# Patient Record
Sex: FEMALE | Race: WHITE | NOT HISPANIC OR LATINO | Employment: UNEMPLOYED | URBAN - METROPOLITAN AREA
[De-identification: names, ages, dates, MRNs, and addresses within clinical notes are randomized per-mention and may not be internally consistent; named-entity substitution may affect disease eponyms.]

---

## 2018-01-04 ENCOUNTER — HOSPITAL ENCOUNTER (EMERGENCY)
Facility: HOSPITAL | Age: 23
Discharge: HOME | End: 2018-01-04
Attending: EMERGENCY MEDICINE | Admitting: EMERGENCY MEDICINE
Payer: COMMERCIAL

## 2018-01-04 VITALS
OXYGEN SATURATION: 96 % | RESPIRATION RATE: 20 BRPM | SYSTOLIC BLOOD PRESSURE: 135 MMHG | TEMPERATURE: 97.7 F | HEART RATE: 91 BPM | WEIGHT: 175 LBS | DIASTOLIC BLOOD PRESSURE: 78 MMHG

## 2018-01-04 DIAGNOSIS — J02.9 PHARYNGITIS: Primary | ICD-10-CM

## 2018-01-04 DIAGNOSIS — J03.90 TONSILLITIS: ICD-10-CM

## 2018-01-04 LAB — EXT PREG TEST URINE: NORMAL

## 2018-01-04 PROCEDURE — 99284 EMERGENCY DEPT VISIT MOD MDM: CPT

## 2018-01-04 PROCEDURE — 96372 THER/PROPH/DIAG INJ SC/IM: CPT

## 2018-01-04 PROCEDURE — 81025 URINE PREGNANCY TEST: CPT | Performed by: EMERGENCY MEDICINE

## 2018-01-04 RX ORDER — DEXAMETHASONE SODIUM PHOSPHATE 10 MG/ML
8 INJECTION, SOLUTION INTRAMUSCULAR; INTRAVENOUS ONCE
Status: COMPLETED | OUTPATIENT
Start: 2018-01-04 | End: 2018-01-04

## 2018-01-04 RX ORDER — IBUPROFEN 800 MG/1
800 TABLET ORAL 3 TIMES DAILY
Qty: 21 TABLET | Refills: 0 | Status: SHIPPED | OUTPATIENT
Start: 2018-01-04 | End: 2021-06-24

## 2018-01-04 RX ORDER — KETOROLAC TROMETHAMINE 30 MG/ML
60 INJECTION, SOLUTION INTRAMUSCULAR; INTRAVENOUS ONCE
Status: COMPLETED | OUTPATIENT
Start: 2018-01-04 | End: 2018-01-04

## 2018-01-04 RX ADMIN — DEXAMETHASONE SODIUM PHOSPHATE 8 MG: 10 INJECTION, SOLUTION INTRAMUSCULAR; INTRAVENOUS at 07:38

## 2018-01-04 RX ADMIN — KETOROLAC TROMETHAMINE 60 MG: 30 INJECTION, SOLUTION INTRAMUSCULAR at 07:38

## 2018-01-04 NOTE — ED PROVIDER NOTES
History  Chief Complaint   Patient presents with    Sore Throat - Complicated     has seen urgent care twice  diagnoased with strep and a sinus infection, now difficult swallowing  History provided by:  Patient   used: No    Sore Throat   Location:  Generalized  Quality:  Aching and burning  Severity:  Moderate  Onset quality:  Gradual  Duration:  1 week  Timing:  Constant  Progression:  Unchanged  Chronicity:  New  Relieved by:  None tried  Worsened by:  Swallowing, eating and drinking  Ineffective treatments: Abx, recently seen at [address] dx 1st with sinusitis, tx with Augmenting for 4 days, then dx with strep pharyngitis, now on Amox since yesterday, but swelling is unchaged  Associated symptoms: adenopathy, chills, ear pain, sinus congestion and trouble swallowing    Associated symptoms: no abdominal pain, no chest pain, no cough, no drooling, no ear discharge, no epistaxis, no eye discharge, no fever, no headaches, no neck stiffness, no night sweats, no plugged ear sensation, no postnasal drip, no rash, no rhinorrhea, no shortness of breath, no stridor and no voice change    Associated symptoms comment:  Painful swallowing  Risk factors: no exposure to strep, no exposure to mono, no sick contacts, no recent endoscopy and no recent ENT procedure    Risk factors comment:  Dx with strep      None       History reviewed  No pertinent past medical history  History reviewed  No pertinent surgical history  History reviewed  No pertinent family history  I have reviewed and agree with the history as documented  Social History   Substance Use Topics    Smoking status: Current Every Day Smoker     Packs/day: 0 25    Smokeless tobacco: Never Used    Alcohol use No        Review of Systems   Constitutional: Positive for chills  Negative for diaphoresis, fatigue, fever and night sweats  HENT: Positive for ear pain, sinus pressure, sore throat and trouble swallowing   Negative for congestion, dental problem, drooling, ear discharge, facial swelling, hearing loss, mouth sores, nosebleeds, postnasal drip, rhinorrhea, sinus pain, sneezing, tinnitus and voice change  Left ear pain when she swallows  Sinus congestion/pressure improved   Eyes: Negative for pain, discharge, redness and itching  Respiratory: Negative for cough, chest tightness, shortness of breath and stridor  Cardiovascular: Negative for chest pain  Gastrointestinal: Negative for abdominal pain, diarrhea, nausea and vomiting  Genitourinary: Negative for difficulty urinating, flank pain and menstrual problem  LMP 2 wks ago   Musculoskeletal: Negative for back pain, neck pain and neck stiffness  Skin: Negative for pallor, rash and wound  Neurological: Negative for dizziness, syncope, weakness, light-headedness and headaches  Hematological: Positive for adenopathy  Psychiatric/Behavioral: The patient is nervous/anxious  Physical Exam  ED Triage Vitals [01/04/18 0711]   Temperature Pulse Respirations Blood Pressure SpO2   97 7 °F (36 5 °C) 91 20 135/78 96 %      Temp Source Heart Rate Source Patient Position - Orthostatic VS BP Location FiO2 (%)   Tympanic Monitor Sitting Left arm --      Pain Score       7           Orthostatic Vital Signs  Vitals:    01/04/18 0711   BP: 135/78   Pulse: 91   Patient Position - Orthostatic VS: Sitting       Physical Exam   Constitutional: She is oriented to person, place, and time  She appears well-developed and well-nourished  No distress  overwt F in NAD   HENT:   Head: Normocephalic and atraumatic  Right Ear: Hearing, tympanic membrane, external ear and ear canal normal    Left Ear: Hearing, external ear and ear canal normal  No lacerations  No drainage, swelling or tenderness  No foreign bodies  No mastoid tenderness  Tympanic membrane is bulging  Tympanic membrane is not injected, not scarred, not perforated, not erythematous and not retracted    No middle ear effusion  No hemotympanum  No decreased hearing is noted  Nose: Nose normal    Mouth/Throat: Mucous membranes are normal  Oropharyngeal exudate and posterior oropharyngeal erythema present  No posterior oropharyngeal edema or tonsillar abscesses  Tonsils are 2+ on the right  Tonsils are 2+ on the left  B/l white tonsilar exudates   Eyes: Conjunctivae and EOM are normal  Pupils are equal, round, and reactive to light  Right eye exhibits no discharge  Neck: Normal range of motion  Neck supple  No JVD present  No tracheal deviation present  Cardiovascular: Normal rate, regular rhythm and intact distal pulses  Pulmonary/Chest: Effort normal and breath sounds normal  No stridor  Abdominal: Soft  She exhibits no distension  Musculoskeletal: Normal range of motion  Lymphadenopathy:     She has cervical adenopathy  Neurological: She is alert and oriented to person, place, and time  Skin: Skin is warm and dry  She is not diaphoretic  Nursing note and vitals reviewed        ED Medications  Medications   ketorolac (TORADOL) injection 60 mg (not administered)   dexamethasone (PF) (DECADRON) injection 8 mg (not administered)       Diagnostic Studies  Results Reviewed     Procedure Component Value Units Date/Time    POCT pregnancy, urine [[id number]]     Lab Status:  No result                  No orders to display              Procedures  Procedures       Phone Contacts  ED Phone Contact    ED Course  ED Course                                MDM  Number of Diagnoses or Management Options  Pharyngitis: established and worsening  Tonsillitis: established and worsening  Diagnosis management comments: Continue PO abx  - PRN analgesia/antiinflammatories  - f/u PMD      Risk of Complications, Morbidity, and/or Mortality  Presenting problems: low  Diagnostic procedures: minimal  Management options: low    Patient Progress  Patient progress: stable    CritCare Time    Disposition  Final diagnoses:   Pharyngitis Tonsillitis     Time reflects when diagnosis was documented in both MDM as applicable and the Disposition within this note     Time User Action Codes Description Comment    1/4/2018  7:22 AM Mayme Ohm Add [J02 9] Pharyngitis     1/4/2018  7:22 AM C/ Bud [address], [address] [U07 65] Tonsillitis       ED Disposition     ED Disposition Condition Comment    Discharge  [facility] discharge to home/self care  Condition at discharge: Good        Follow-up Information     Follow up With Specialties Details Why Contact Info    Kacy Bolaños MD  Schedule an appointment as soon as possible for a visit  [address] [address]  [phone]          Patient's Medications   Discharge Prescriptions    IBUPROFEN (MOTRIN) 800 MG TABLET    Take 1 tablet by mouth 3 (three) times a day       Start Date: 1/4/2018  End Date: --       Order Dose: 800 mg       Quantity: 21 tablet    Refills: 0     No discharge procedures on file      ED Provider  Electronically Signed by           Jae Fonseca MD  01/04/18 7949

## 2018-01-04 NOTE — DISCHARGE INSTRUCTIONS
Pharyngitis   WHAT YOU NEED TO KNOW:   What is pharyngitis? Pharyngitis, or sore throat, is inflammation of the tissues and structures in your pharynx (throat)  Pharyngitis is most often caused by bacteria  It may also be caused by a cold or flu virus  Other causes include smoking, allergies, or acid reflux  What signs and symptoms may occur with pharyngitis? · Sore throat or pain when you swallow    · Fever, chills, and body aches    · Hoarse or raspy voice    · Cough, runny or stuffy nose, itchy or watery eyes    · Headache    · Upset stomach and loss of appetite    · Mild neck stiffness    · Swollen glands that feel like hard lumps when you touch your neck    · White and yellow pus-filled blisters in the back of your throat  How is pharyngitis diagnosed? Tell your healthcare provider about your symptoms  He may look inside your throat and feel your neck  You may also need the following tests:  · A throat culture  may show which germ is causing your sore throat  A cotton swab is rubbed against the back of your throat  · Blood tests  may be used to show if another medical condition is causing your sore throat  How is pharyngitis treated? Viral pharyngitis will go away on its own without treatment  Your sore throat should start to feel better in 3 to 5 days for both viral and bacterial infections  You may need any of the following:  · Antibiotics  treat a bacterial infection  · NSAIDs , such as ibuprofen, help decrease swelling, pain, and fever  NSAIDs can cause stomach bleeding or kidney problems in certain people  If you take blood thinner medicine, always ask your healthcare provider if NSAIDs are safe for you  Always read the medicine label and follow directions  · Acetaminophen  decreases pain and fever  It is available without a doctor's order  Ask how much to take and how often to take it  Follow directions  Acetaminophen can cause liver damage if not taken correctly    How can I manage my symptoms? · Gargle salt water  Mix ¼ teaspoon salt in an 8 ounce glass of warm water and gargle  This may help decrease swelling in your throat  · Drink liquids as directed  You may need to drink more liquids than usual  Liquids may help soothe your throat and prevent dehydration  Ask how much liquid to drink each day and which liquids are best for you  · Use a cool-steam humidifier  to help moisten the air in your room and decrease your cough  · Soothe your throat  with cough drops, ice, soft foods, or popsicles  How can I prevent the spread of pharyngitis? Cover your mouth and nose when you cough or sneeze  Do not share food or drinks  Wash your hands often  Use soap and water  If soap and water are unavailable, use an alcohol based hand   Call 911 for any of the following:   · You have trouble breathing or swallowing because your throat is swollen or sore  When should I seek immediate care? · You are drooling because it hurts too much to swallow  · Your fever is higher than 102? F (39?C) or lasts longer than 3 days  · You are confused  · You taste blood in your throat  When should I contact my healthcare provider? · Your throat pain gets worse  · You have a painful lump in your throat that does not go away after 5 days  · Your symptoms do not improve after 5 days  · You have questions or concerns about your condition or care  CARE AGREEMENT:   You have the right to help plan your care  Learn about your health condition and how it may be treated  Discuss treatment options with your caregivers to decide what care you want to receive  You always have the right to refuse treatment  The above information is an  only  It is not intended as medical advice for individual conditions or treatments  Talk to your doctor, nurse or pharmacist before following any medical regimen to see if it is safe and effective for you    © 2017 [address] Information is for End User's use only and may not be sold, redistributed or otherwise used for commercial purposes  All illustrations and images included in CareNotes® are the copyrighted property of A D A M , Inc  or Arnold Richard  Tonsillitis, Ambulatory Care   GENERAL INFORMATION:   Tonsillitis  is an inflammation of the tonsils  Tonsils are 2 large lumps of tissue in the back of your throat  They help fight infection  Tonsillitis may be caused by a bacterial or a viral infection  Common symptoms include the following:   · Severe sore throat    · Red, swollen tonsils    · Painful swallowing    · Fever and chills    · Bad breath    · White spots on the tonsils  Seek immediate care  if you have trouble breathing because your tonsils are swollen  Treatment for tonsillitis  may include medicine to decrease throat pain  Antibiotic medicine may be given if your tonsillitis was caused by bacteria  You may also need surgery to remove your tonsils for chronic or recurrent tonsillitis  Prevent the spread of germs  by washing your hands often  Do not share food or drinks with anyone  Ask when you can return to work  Manage your symptoms:   · Drink plenty of liquids  to help prevent dehydration  Ask your healthcare provider how much you should drink  · Gargle with warm salt water  to help decrease throat pain  Mix 1 teaspoon of salt in 1 cup of warm water  Ask how often you should do this  Follow up with your healthcare provider as directed:  Write down your questions so you remember to ask them during your visits  CARE AGREEMENT:   You have the right to help plan your care  Learn about your health condition and how it may be treated  Discuss treatment options with your caregivers to decide what care you want to receive  You always have the right to refuse treatment  The above information is an  only  It is not intended as medical advice for individual conditions or treatments   Talk to your doctor, nurse or pharmacist before following any medical regimen to see if it is safe and effective for you  © 2014 [address] is for End User's use only and may not be sold, redistributed or otherwise used for commercial purposes  All illustrations and images included in CareNotes® are the copyrighted property of A D A M , Inc  or Arnold Richard

## 2021-06-01 ENCOUNTER — OFFICE VISIT (OUTPATIENT)
Dept: OBGYN CLINIC | Facility: CLINIC | Age: 26
End: 2021-06-01
Payer: COMMERCIAL

## 2021-06-01 VITALS
WEIGHT: 185 LBS | HEIGHT: 63 IN | BODY MASS INDEX: 32.78 KG/M2 | SYSTOLIC BLOOD PRESSURE: 144 MMHG | DIASTOLIC BLOOD PRESSURE: 80 MMHG

## 2021-06-01 DIAGNOSIS — Z72.51 HIGH RISK HETEROSEXUAL BEHAVIOR: ICD-10-CM

## 2021-06-01 DIAGNOSIS — Z12.4 SCREENING FOR MALIGNANT NEOPLASM OF THE CERVIX: ICD-10-CM

## 2021-06-01 DIAGNOSIS — Z01.419 ROUTINE GYNECOLOGICAL EXAMINATION: Primary | ICD-10-CM

## 2021-06-01 DIAGNOSIS — Z36.9 FIRST TRIMESTER SCREENING: ICD-10-CM

## 2021-06-01 DIAGNOSIS — N91.2 AMENORRHEA: ICD-10-CM

## 2021-06-01 DIAGNOSIS — Z11.3 ENCOUNTER FOR SPECIAL SCREENING EXAMINATION FOR INFECTION WITH PREDOMINANTLY SEXUAL MODE OF TRANSMISSION: ICD-10-CM

## 2021-06-01 LAB — SL AMB POCT URINE HCG: POSITIVE

## 2021-06-01 PROCEDURE — S0610 ANNUAL GYNECOLOGICAL EXAMINA: HCPCS | Performed by: OBSTETRICS & GYNECOLOGY

## 2021-06-01 PROCEDURE — 81025 URINE PREGNANCY TEST: CPT | Performed by: OBSTETRICS & GYNECOLOGY

## 2021-06-01 RX ORDER — FEXOFENADINE HCL AND PSEUDOEPHEDRINE HCI 180; 240 MG/1; MG/1
1 TABLET, EXTENDED RELEASE ORAL DAILY
COMMUNITY
End: 2021-06-24

## 2021-06-02 NOTE — PROGRESS NOTES
Sandra Olivares is a 22 y o  female who presents for annual well woman exam      Patient presents to the office today for annual exam also at menstrual period was 2021 did pregnancy test came back positive denies any vaginal bleeding since her last menses denies any vaginal itching or odor or discharge denies any urgency frequency or dysuria    Menstrual History:  OB History        2    Para        Term                AB   1    Living           SAB        TAB        Ectopic        Multiple        Live Births                    Patient's last menstrual period was 2021 (exact date)  The following portions of the patient's history were reviewed and updated as appropriate: allergies, current medications, past family history, past medical history, past social history, past surgical history and problem list     Review of Systems  Review of Systems   Constitutional: Negative for activity change, appetite change, chills, fatigue and fever  Respiratory: Negative for cough and shortness of breath  Cardiovascular: Negative for chest pain, palpitations and leg swelling  Gastrointestinal: Negative for abdominal pain, constipation, diarrhea, nausea and vomiting  Genitourinary: Negative for difficulty urinating, dysuria, flank pain, frequency, hematuria, urgency and vaginal discharge  Neurological: Negative for dizziness and headaches  Psychiatric/Behavioral: Negative for confusion            Objective      /80 (BP Location: Left arm, Patient Position: Sitting, Cuff Size: Standard)   Ht 5' 3" (1 6 m)   Wt 83 9 kg (185 lb)   LMP 2021 (Exact Date)   BMI 32 77 kg/m²     Physical Exam  OBGyn Exam     General:   alert and oriented, in no acute distress, alert, appears stated age and cooperative   Heart: regular rate and rhythm, S1, S2 normal, no murmur, click, rub or gallop   Lungs: clear to auscultation bilaterally   Abdomen: soft, non-tender, without masses or organomegaly   Vulva: normal   Vagina: normal mucosa, normal discharge   Cervix: no lesions   Uterus: normal size   Adnexa:  Breast Exam:  normal adnexa  breasts appear normal, no suspicious masses, no skin or nipple changes or axillary nodes  Bedside ultrasound performed small sac noted          Assessment      @well woman@   30-year-old female  Annual exam   asthma   ultrasound show early pregnancy small sac noted  Plan   Pap/reflux   GC/CT   Prenatal vitamin daily  Repeat ultrasound in 2 weeks   Return to office for [place]   All questions answered  There are no Patient Instructions on file for this visit

## 2021-06-07 ENCOUNTER — HOSPITAL ENCOUNTER (OUTPATIENT)
Dept: RADIOLOGY | Facility: HOSPITAL | Age: 26
Discharge: HOME | End: 2021-06-07
Attending: OBSTETRICS & GYNECOLOGY
Payer: COMMERCIAL

## 2021-06-07 DIAGNOSIS — N91.2 AMENORRHEA: ICD-10-CM

## 2021-06-07 PROCEDURE — 76801 OB US < 14 WKS SINGLE FETUS: CPT

## 2021-06-08 LAB
C TRACH RRNA SPEC QL NAA+PROBE: NEGATIVE
CYTOLOGIST CVX/VAG CYTO: ABNORMAL
DX ICD CODE: ABNORMAL
DX ICD CODE: ABNORMAL
HPV I/H RISK 4 DNA CVX QL PROBE+SIG AMP: POSITIVE
HPV16 DNA CVX QL PROBE+SIG AMP: NEGATIVE
HPV18+45 E6+E7 MRNA CVX QL NAA+PROBE: NEGATIVE
N GONORRHOEA RRNA SPEC QL NAA+PROBE: NEGATIVE
OTHER STN SPEC: ABNORMAL
OTHER STN SPEC: ABNORMAL
PATH REPORT.FINAL DX SPEC: ABNORMAL
PATHOLOGIST CVX/VAG CYTO: ABNORMAL
RECOM F/U CVX/VAG CYTO: ABNORMAL
SL AMB NOTE:: ABNORMAL
SL AMB SPECIMEN ADEQUACY: ABNORMAL
SL AMB TEST METHODOLOGY: ABNORMAL

## 2021-06-15 ENCOUNTER — ROUTINE PRENATAL (OUTPATIENT)
Dept: OBGYN CLINIC | Facility: CLINIC | Age: 26
End: 2021-06-15
Payer: COMMERCIAL

## 2021-06-15 VITALS
SYSTOLIC BLOOD PRESSURE: 140 MMHG | BODY MASS INDEX: 33.66 KG/M2 | DIASTOLIC BLOOD PRESSURE: 80 MMHG | HEIGHT: 63 IN | WEIGHT: 190 LBS

## 2021-06-15 DIAGNOSIS — N91.2 AMENORRHEA: Primary | ICD-10-CM

## 2021-06-15 DIAGNOSIS — Z3A.01 LESS THAN 8 WEEKS GESTATION OF PREGNANCY: ICD-10-CM

## 2021-06-15 PROCEDURE — 99213 OFFICE O/P EST LOW 20 MIN: CPT | Performed by: OBSTETRICS & GYNECOLOGY

## 2021-06-15 NOTE — PROGRESS NOTES
Assessment/Plan:     Diagnoses and all orders for this visit:    Amenorrhea    Less than 8 weeks gestation of pregnancy    Other orders  -     Prenatal Vit-Fe Fumarate-FA (PRENATAL 1+1 PO); Take by mouth           59-year-old female  Asthma   Early pregnancy bedside ultrasound today IUP 6 weeks 5 days   Fetal viability confirmed   Plan   Return to office for C OC   MFM for nuchal translucency   Prenatal vitamin daily    Subjective:      Patient ID: Raisa Groves is a 22 y o  female  HPI    59-year-old female presents to the office today with partner for ultrasound viability denies any vaginal itching or odor or discharge denies any vaginal bleeding denies any pelvic pain denies any vomiting occasional nausea patient is taking prenatal vitamin daily recommend to switch to prenatal gummy secondary to some nausea with prenatal  Ultrasound done June 7 results discussed with patient    The following portions of the patient's history were reviewed and updated as appropriate: allergies, current medications, past family history, past medical history, past social history, past surgical history and problem list     Review of Systems      Objective:      /80 (BP Location: Left arm)   Ht 5' 3" (1 6 m)   Wt 86 2 kg (190 lb)   LMP 04/06/2021   BMI 33 66 kg/m²          Physical Exam  Constitutional:       Appearance: She is well-developed  Abdominal:      General: There is no distension  Palpations: Abdomen is soft  Tenderness: There is no abdominal tenderness  Genitourinary:     Labia:         Right: No rash, tenderness or lesion  Left: No rash, tenderness or lesion  Vagina: No signs of injury  No vaginal discharge, erythema or tenderness  Cervix: No cervical motion tenderness, discharge or friability  Adnexa:         Right: No mass, tenderness or fullness  Left: No mass, tenderness or fullness          Comments:  Bedside ultrasound performed confirm fetal viability IUP 6 weeks 5 days fetal heart rate 150 beats per minute  Neurological:      Mental Status: She is alert and oriented to person, place, and time     Psychiatric:         Behavior: Behavior normal

## 2021-06-24 ENCOUNTER — INITIAL PRENATAL (OUTPATIENT)
Dept: OBGYN CLINIC | Facility: CLINIC | Age: 26
End: 2021-06-24

## 2021-06-24 VITALS
WEIGHT: 190.8 LBS | BODY MASS INDEX: 33.81 KG/M2 | SYSTOLIC BLOOD PRESSURE: 130 MMHG | HEIGHT: 63 IN | DIASTOLIC BLOOD PRESSURE: 70 MMHG

## 2021-06-24 DIAGNOSIS — Z33.1 NORMAL PREGNANCY, INCIDENTAL: Primary | ICD-10-CM

## 2021-06-24 DIAGNOSIS — Z83.49 FAMILY HISTORY OF THYROID DISEASE: ICD-10-CM

## 2021-06-24 DIAGNOSIS — Z34.01 PRIMIGRAVIDA IN FIRST TRIMESTER: ICD-10-CM

## 2021-06-24 PROCEDURE — OBC: Performed by: OBSTETRICS & GYNECOLOGY

## 2021-06-24 RX ORDER — ALBUTEROL SULFATE 90 UG/1
2 AEROSOL, METERED RESPIRATORY (INHALATION) EVERY 6 HOURS PRN
COMMUNITY

## 2021-06-24 RX ORDER — ACETAMINOPHEN 325 MG/1
650 TABLET ORAL EVERY 6 HOURS PRN
COMMUNITY

## 2021-06-24 NOTE — PROGRESS NOTES
OB Intake    Patient presents for OB intake interview  Accompanied by: Herself   Planned pregnancy, FOB involved and supportive      Hx of  delivery prior to 36 weeks 6 days: No  Hx of hypertension:  No  Patient's last menstrual period was 2021 (exact date)  Estimated Date of Delivery: 2022  Signs and Symptoms of pregnancy:  - No swelling,or dizziness or visual problems, some nausea with No vomiting   - Constipation: Yes  - Headaches: yes  - Cramping/spotting: No  - PICA cravings: No  - Cats : No  - Diabetes:    History of gestational diabetes :No   BMI >35  :No   Advance maternal age >35 :No   First degree relative with type 2 diabetes :No   History of PCOS :No   Current metformin use :No   Prior history of macrosomia or LGA :No    Prenatal labs including: CBC,ABO,thyroid pane;, varicella, Rubella, hepatitis, RPR,HIV, Hemoglobin Electrophoresis  Last Pap: 2021  Tdap - counseled to be given after 27 weeks  Influenza vaccine discussed and information sheet given  Vaccinated: No  COVID Vaccine : NO  Hx of MRSA: No  Dental visit with last 6 months - Yes, recommendations discussed  Interview education:  [facility] Pregnancy Essentials booklet reviewed and explained  Handouts given at today's visit     [facility] phone application guide     [address] [place] [address] support center   [facility] Maternal Fetal Medicine        Sequential screening pamphlet                   Cystic fibrosis information    [facility]: Yes  Delio Hernández form submitted: No    Mychart activated: YES    Interview done by : Marium García LPN

## 2021-06-29 LAB
EXTERNAL ABO GROUPING: NORMAL
EXTERNAL ANTIBODY SCREEN: NORMAL
EXTERNAL HEMATOCRIT: 40.8 %
EXTERNAL HEMOGLOBIN: 13.6 G/DL
EXTERNAL HEPATITIS B SURFACE ANTIGEN: NON REACTIVE
EXTERNAL PLATELET COUNT: 299 K/ΜL
EXTERNAL RH FACTOR: POSITIVE
EXTERNAL RUBELLA IGG QUANTITATION: NORMAL
EXTERNAL SYPHILIS RPR SCREEN: NORMAL

## 2021-06-30 LAB
ABO GROUP BLD: NORMAL
BASOPHILS # BLD AUTO: 0 X10E3/UL (ref 0–0.2)
BASOPHILS NFR BLD AUTO: 1 %
BLD GP AB SCN SERPL QL: NEGATIVE
EOSINOPHIL # BLD AUTO: 0.2 X10E3/UL (ref 0–0.4)
EOSINOPHIL NFR BLD AUTO: 2 %
ERYTHROCYTE [DISTWIDTH] IN BLOOD BY AUTOMATED COUNT: 12.6 % (ref 11.7–15.4)
HBV SURFACE AG SERPL QL IA: NEGATIVE
HCT VFR BLD AUTO: 40.8 % (ref 34–46.6)
HGB BLD-MCNC: 13.6 G/DL (ref 11.1–15.9)
IMM GRANULOCYTES # BLD: 0 X10E3/UL (ref 0–0.1)
IMM GRANULOCYTES NFR BLD: 0 %
LYMPHOCYTES # BLD AUTO: 1.5 X10E3/UL (ref 0.7–3.1)
LYMPHOCYTES NFR BLD AUTO: 18 %
MCH RBC QN AUTO: 28.9 PG (ref 26.6–33)
MCHC RBC AUTO-ENTMCNC: 33.3 G/DL (ref 31.5–35.7)
MCV RBC AUTO: 87 FL (ref 79–97)
MONOCYTES # BLD AUTO: 0.4 X10E3/UL (ref 0.1–0.9)
MONOCYTES NFR BLD AUTO: 5 %
NEUTROPHILS # BLD AUTO: 5.9 X10E3/UL (ref 1.4–7)
NEUTROPHILS NFR BLD AUTO: 74 %
PLATELET # BLD AUTO: 299 X10E3/UL (ref 150–450)
RBC # BLD AUTO: 4.71 X10E6/UL (ref 3.77–5.28)
RH BLD: POSITIVE
RPR SER QL: NON REACTIVE
RUBV IGG SERPL IA-ACNC: 3.12 INDEX
TSH SERPL DL<=0.005 MIU/L-ACNC: 1.18 UIU/ML (ref 0.45–4.5)
WBC # BLD AUTO: 8.1 X10E3/UL (ref 3.4–10.8)

## 2021-07-06 LAB
CF COMMENT: NORMAL
CFTR MUT ANL BLD/T: NORMAL
CLINICAL INFO: NORMAL
ETHNIC BACKGROUND STATED: NORMAL
GENE MUT TESTED BLD/T: NORMAL
GENERAL COMMENTS:: NORMAL
HGB A MFR BLD: 2.7 % (ref 1.8–3.2)
HGB A MFR BLD: 97.3 % (ref 96.4–98.8)
HGB F MFR BLD: 0 % (ref 0–2)
HGB FRACT BLD-IMP: NORMAL
HGB S MFR BLD: 0 %
LAB DIRECTOR NAME PROVIDER: NORMAL
REASON FOR REFERRAL (NARRATIVE): NORMAL
REF LAB TEST METHOD: NORMAL
SL AMB DISCLAIMER: NORMAL
SL AMB GENETIC COUNSELOR: NORMAL
SMN1 GENE MUT ANL BLD/T: NORMAL
SPECIMEN SOURCE: NORMAL
VZV IGG SER IA-ACNC: 1067 INDEX

## 2021-07-09 ENCOUNTER — OB ABSTRACT (OUTPATIENT)
Dept: OBGYN CLINIC | Facility: CLINIC | Age: 26
End: 2021-07-09

## 2021-07-22 ENCOUNTER — TELEPHONE (OUTPATIENT)
Dept: PERINATAL CARE | Facility: CLINIC | Age: 26
End: 2021-07-22

## 2021-07-22 NOTE — TELEPHONE ENCOUNTER
Phone call to patient and left M to confirm M NT US appt :    [facility] sent you an important message via [facility]  Message contains a video link that will explain genetic testing verus screening and information on how to check your insurance coverage for specialty genetic labs  Please review this information prior to your HANSEL/ Shilo Mayorga appointment  Contact the [facility] nurse line for any questions # [phone]  We look forward to seeing you soon at [facility]

## 2021-07-26 ENCOUNTER — ROUTINE PRENATAL (OUTPATIENT)
Dept: PERINATAL CARE | Facility: OTHER | Age: 26
End: 2021-07-26
Payer: COMMERCIAL

## 2021-07-26 VITALS
WEIGHT: 190.7 LBS | HEART RATE: 109 BPM | SYSTOLIC BLOOD PRESSURE: 132 MMHG | DIASTOLIC BLOOD PRESSURE: 86 MMHG | HEIGHT: 63 IN | BODY MASS INDEX: 33.79 KG/M2

## 2021-07-26 DIAGNOSIS — Z36.82 ENCOUNTER FOR NUCHAL TRANSLUCENCY TESTING: Primary | ICD-10-CM

## 2021-07-26 DIAGNOSIS — Z36.9 FIRST TRIMESTER SCREENING: ICD-10-CM

## 2021-07-26 DIAGNOSIS — Z3A.13 13 WEEKS GESTATION OF PREGNANCY: ICD-10-CM

## 2021-07-26 DIAGNOSIS — O36.80X0 ENCOUNTER TO DETERMINE FETAL VIABILITY OF PREGNANCY, SINGLE OR UNSPECIFIED FETUS: ICD-10-CM

## 2021-07-26 PROCEDURE — 76813 OB US NUCHAL MEAS 1 GEST: CPT | Performed by: OBSTETRICS & GYNECOLOGY

## 2021-07-26 PROCEDURE — 99241 PR OFFICE CONSULTATION NEW/ESTAB PATIENT 15 MIN: CPT | Performed by: OBSTETRICS & GYNECOLOGY

## 2021-07-26 PROCEDURE — 76801 OB US < 14 WKS SINGLE FETUS: CPT | Performed by: OBSTETRICS & GYNECOLOGY

## 2021-07-26 RX ORDER — ASPIRIN 81 MG/1
162 TABLET ORAL DAILY
Qty: 180 TABLET | Refills: 3 | Status: ON HOLD | OUTPATIENT
Start: 2021-07-26 | End: 2022-01-28 | Stop reason: ALTCHOICE

## 2021-07-26 NOTE — PROGRESS NOTES
Patient chose to have Part 1 Sequential Screening from [employer]  Finger stick specimen collected from right middle finger and patient tolerated procedure well  Sample mailed to [facility] via [employer]  Explained results will be available in 7-10 business days  [facility] office will call her with results or she can view in [address]  [place] collection for Part 2 is between 16-18 weeks gestation, a lab slip and instruction letter will be mailed from our office  Patient instructed to take the paper lab slip to lab, this specialty genetic test is not yet in Epic  Patient verbalized understanding of all instructions

## 2021-07-26 NOTE — LETTER
July 26, 2021     Bisi King, [address] [facility] [address] [place]    Patient: Shu Hampton   YOB: 1995   Date of Visit: 7/26/2021       Dear Dr Lakhani Ask:    Thank you for referring Rogelio Wallace to me for evaluation  Below are my notes for this consultation  If you have questions, please do not hesitate to call me  I look forward to following your patient along with you  Sincerely,        Trina Hardy MD        CC: No Recipients  Trina Hardy MD  7/26/2021 12:21 PM  Sign when Signing Visit  CONSULT NOTE    Bisi King MD  [address] [facility] [address]  [facility],  [address] [facility]     Thank you for referring your Shu Hampton for a Maternal-Fetal Medicine Consultation:  Below is my consultation  Thank you very much for requesting a consultation on this very nice patient for the indication of genetic screening and to confirm EDC  This is the patient's 2nd pregnancy  First pregnancy resulted in a termination in 2016  She has a history of asthma not currently requiring albuterol frequently  She indicates that she believe she has "white coat hypertension "  She has seasonal allergies for which she takes Allegra  She has no known drug allergies  Her substance use history and family medical history are unremarkable  A review of systems is otherwise negative  We discussed the options for genetic screening, including but not limited to first trimester screening, second trimester screening, combined first and second trimester screening, noninvasive prenatal testing (NIPT) for patients at high risk and diagnostic screening through the use of CVS and amniocentesis    We discussed the risks and benefits of each approach including the sensitivities and false positive rates as well as the difference between a screening test and a diagnostic test   At the conclusion of our discussion the patient elected Sequential Screening to delineate her risk for fetal aneuploidy  A maternal blood sample was obtained on the day of the ultrasound  The first trimester portion of the screening results, encompassing age, nuchal translucency, and biochemistry should be available within one week of testing and will be reported from [facility]   The second stage of sequential screening should be completed between the 15th and 21st week of pregnancy (ideally between 16-18 weeks)  We will call the patient with any and all results and the results should be available in the EMR  Given the patient's history of possible white coat hypertension, nulliparity, and BMI, I recommend initiating low dose aspirin therapy  A recent meta-analysis yielded risk reductions of 24% for preeclampsia, 20% for intrauterine growth restriction, and 14% for  birth, with an absolute risk reduction of 2-5% for preeclampsia, one to 5% for intrauterine growth restriction, and 2-4% for  birth  In this study, there was no identified risk of harm to the mother or fetus but long-term evidence was somewhat limited  Given the overall safety profile and risk-benefit analysis, I recommend 162 mg of aspirin be taken Daily and discontinued at around 36 weeks gestation or 2-3 weeks prior to planned delivery  I reviewed these recommendations with the patient and answered all of her questions to apparent satisfaction  We discussed follow-up in detail and I recommend an anatomy ultrasound be scheduled for 20 weeks gestation  Thank you very much for allowing us to participate in the care of this very nice patient  Should you have any questions, please do not hesitate to contact our office  Please note, in addition to the time spent discussing the results of the ultrasound, I spent approximately 15 minutes of face-to-face time with the patient, greater than 50% of which was spent in counseling and the coordination of care for this patient      Portions of the record may have been created with voice recognition software  Occasional wrong word or "sound a like" substitutions may have occurred due to the inherent limitations of voice recognition software  Read the chart carefully and recognize, using context, where substitutions have occurred  Remy Peterson MD  Attending Physician, Matthias

## 2021-07-26 NOTE — PROGRESS NOTES
CONSULT NOTE    Ren Wray MD  [address] [facility] [address]  [facility]  [address] [facility]     Thank you for referring your Edel Pichardo for a Maternal-Fetal Medicine Consultation:  Below is my consultation  Thank you very much for requesting a consultation on this very nice patient for the indication of genetic screening and to confirm EDC  This is the patient's 2nd pregnancy  First pregnancy resulted in a termination in 2016  She has a history of asthma not currently requiring albuterol frequently  She indicates that she believe she has "white coat hypertension "  She has seasonal allergies for which she takes Allegra  She has no known drug allergies  Her substance use history and family medical history are unremarkable  A review of systems is otherwise negative  We discussed the options for genetic screening, including but not limited to first trimester screening, second trimester screening, combined first and second trimester screening, noninvasive prenatal testing (NIPT) for patients at high risk and diagnostic screening through the use of CVS and amniocentesis  We discussed the risks and benefits of each approach including the sensitivities and false positive rates as well as the difference between a screening test and a diagnostic test   At the conclusion of our discussion the patient elected Sequential Screening to delineate her risk for fetal aneuploidy  A maternal blood sample was obtained on the day of the ultrasound  The first trimester portion of the screening results, encompassing age, nuchal translucency, and biochemistry should be available within one week of testing and will be reported from [facility]   The second stage of sequential screening should be completed between the 15th and 21st week of pregnancy (ideally between 16-18 weeks)  We will call the patient with any and all results and the results should be available in the EMR      Given the patient's history of possible white coat hypertension, nulliparity, and BMI, I recommend initiating low dose aspirin therapy  A recent meta-analysis yielded risk reductions of 24% for preeclampsia, 20% for intrauterine growth restriction, and 14% for  birth, with an absolute risk reduction of 2-5% for preeclampsia, one to 5% for intrauterine growth restriction, and 2-4% for  birth  In this study, there was no identified risk of harm to the mother or fetus but long-term evidence was somewhat limited  Given the overall safety profile and risk-benefit analysis, I recommend 162 mg of aspirin be taken Daily and discontinued at around 36 weeks gestation or 2-3 weeks prior to planned delivery  I reviewed these recommendations with the patient and answered all of her questions to apparent satisfaction  We discussed follow-up in detail and I recommend an anatomy ultrasound be scheduled for 20 weeks gestation  Thank you very much for allowing us to participate in the care of this very nice patient  Should you have any questions, please do not hesitate to contact our office  Please note, in addition to the time spent discussing the results of the ultrasound, I spent approximately 15 minutes of face-to-face time with the patient, greater than 50% of which was spent in counseling and the coordination of care for this patient  Portions of the record may have been created with voice recognition software  Occasional wrong word or "sound a like" substitutions may have occurred due to the inherent limitations of voice recognition software  Read the chart carefully and recognize, using context, where substitutions have occurred  Remy Dunbar MD  Attending Physician, Matthias

## 2021-07-28 ENCOUNTER — ROUTINE PRENATAL (OUTPATIENT)
Dept: OBGYN CLINIC | Facility: CLINIC | Age: 26
End: 2021-07-28

## 2021-07-28 VITALS
HEIGHT: 63 IN | SYSTOLIC BLOOD PRESSURE: 130 MMHG | BODY MASS INDEX: 33.98 KG/M2 | WEIGHT: 191.8 LBS | DIASTOLIC BLOOD PRESSURE: 86 MMHG

## 2021-07-28 DIAGNOSIS — G43.009 MIGRAINE WITHOUT AURA AND WITHOUT STATUS MIGRAINOSUS, NOT INTRACTABLE: ICD-10-CM

## 2021-07-28 DIAGNOSIS — Z3A.13 PREGNANCY WITH 13 COMPLETED WEEKS GESTATION: Primary | ICD-10-CM

## 2021-07-28 LAB
SL AMB  POCT GLUCOSE, UA: 0
SL AMB LEUKOCYTE ESTERASE,UA: 0
SL AMB POCT BILIRUBIN,UA: 0
SL AMB POCT BLOOD,UA: 0
SL AMB POCT CLARITY,UA: 0
SL AMB POCT COLOR,UA: 0
SL AMB POCT KETONES,UA: ABNORMAL
SL AMB POCT NITRITE,UA: 0
SL AMB POCT PH,UA: 0
SL AMB POCT SPECIFIC GRAVITY,UA: 0
SL AMB POCT URINE PROTEIN: ABNORMAL
SL AMB POCT UROBILINOGEN: 0

## 2021-07-28 PROCEDURE — PNV: Performed by: OBSTETRICS & GYNECOLOGY

## 2021-07-28 RX ORDER — METOCLOPRAMIDE 10 MG/1
10 TABLET ORAL 3 TIMES DAILY
Qty: 30 TABLET | Refills: 1 | Status: ON HOLD | OUTPATIENT
Start: 2021-07-28 | End: 2022-01-28 | Stop reason: ALTCHOICE

## 2021-07-28 NOTE — PROGRESS NOTES
Patient seen evaluated denies any vaginal bleeding or pelvic pain, complaining of nausea and vomiting happen once this morning improve overall as well as complaining of headache headache medication will be called Reglan with Tylenol to try importance of keeping herself hydrated having adequate sleep hours discussed with patient, patient has 1st trimester ultrasound done with Maternal-Fetal Medicine did 1st trimester screen aware she need to have alpha-fetoprotein at 16 weeks anatomical survey schedule at 20 weeks lab results review with patient and discussed to continue prenatal vitamin daily baby aspirin as recommended by M Reglan and Tylenol when needed to have eye exam done and return to office in 4 weeks

## 2021-07-29 ENCOUNTER — TELEPHONE (OUTPATIENT)
Dept: OBGYN CLINIC | Facility: CLINIC | Age: 26
End: 2021-07-29

## 2021-08-03 ENCOUNTER — TELEPHONE (OUTPATIENT)
Dept: PERINATAL CARE | Facility: CLINIC | Age: 26
End: 2021-08-03

## 2021-08-03 NOTE — TELEPHONE ENCOUNTER
Left voice mail message at number provided on communication consent  Result of [facility] Part 1 Sequential Screen given and Part 2 instructions explained  Patient to call MFM nurse line for any questions, number provided  #[phone]  TRF and lab instruction letter mailed

## 2021-08-03 NOTE — LETTER
08/03/21  Genesis Kulkarni Pross  1995    Thank you for completing Part 1 of your Sequential Screen  To obtain a complete test result, complete blood work for Part 2 Sequential Screen between the weeks of 8/13/21 to 8/27/21  Please verify which laboratory is In Network with your insurance plan ([facility] or [employer])      If you choose to use a [facility] lab, please go to a location from this list:     [address]  [address] [facility], [facility] [address]                [facility], [facility] 425 [facility]  [address] [facility], [facility] [address]                    [facility] 19, [facility] [address] [facility]  [address], [place], [address] [place] [address]             [address], [address]  [address], [facility] [address]             [address] [facility] [address]  [address] [facility], [facility] 3              [facility] [id number] [place] [address] Drive [address] [facility]  [address], [facility] [address]                        [address], [facility] [address] [facility]  [address] [facility] [address], [address] [facility] [address] [address]            207 [facility], [facility] 969 [facility] 38, [facility] gap [address]    For list of Nicole [facility]   If you choose [facility], please remind the phlebotomist the screen is ordered for [facility]  You can take this letter with you to the lab  Call Maternal Fetal Medicine nurse line any questions at [phone]     Thank you,  [facility] Maternal Fetal Medicine Staff

## 2021-08-03 NOTE — TELEPHONE ENCOUNTER
----- Message from Alonzo Hoffman MD sent at 8/3/2021  9:15 AM EDT -----  First-trimester genetic screening results are reassuring  Please notify patient of normal results if she has not viewed them in MyChart and insure she has instructions to complete the 2nd trimester portion of her sequential screening

## 2021-08-09 ENCOUNTER — OB ABSTRACT (OUTPATIENT)
Dept: OBGYN CLINIC | Facility: CLINIC | Age: 26
End: 2021-08-09

## 2021-08-27 ENCOUNTER — TELEPHONE (OUTPATIENT)
Dept: PERINATAL CARE | Facility: CLINIC | Age: 26
End: 2021-08-27

## 2021-08-27 NOTE — TELEPHONE ENCOUNTER
No result for Part 2 Seq Screen pending in [facility] portal patient currently 18 weeks gestation  Left VMM to patient to be sure she received the [facility] /[facility] Part 2 lab slip and letter from our office or if she had any questions regarding screening  Reviewed last date for timed specimen collection

## 2021-08-31 ENCOUNTER — ROUTINE PRENATAL (OUTPATIENT)
Dept: OBGYN CLINIC | Facility: CLINIC | Age: 26
End: 2021-08-31

## 2021-08-31 VITALS — DIASTOLIC BLOOD PRESSURE: 80 MMHG | WEIGHT: 191 LBS | BODY MASS INDEX: 33.83 KG/M2 | SYSTOLIC BLOOD PRESSURE: 120 MMHG

## 2021-08-31 DIAGNOSIS — Z3A.18 18 WEEKS GESTATION OF PREGNANCY: Primary | ICD-10-CM

## 2021-08-31 LAB
SL AMB  POCT GLUCOSE, UA: NORMAL
SL AMB LEUKOCYTE ESTERASE,UA: NORMAL
SL AMB POCT NITRITE,UA: NORMAL
SL AMB POCT URINE PROTEIN: NORMAL

## 2021-08-31 PROCEDURE — PNV: Performed by: OBSTETRICS & GYNECOLOGY

## 2021-09-15 ENCOUNTER — ROUTINE PRENATAL (OUTPATIENT)
Dept: PERINATAL CARE | Facility: OTHER | Age: 26
End: 2021-09-15
Payer: COMMERCIAL

## 2021-09-15 VITALS
HEIGHT: 63 IN | BODY MASS INDEX: 34.77 KG/M2 | DIASTOLIC BLOOD PRESSURE: 71 MMHG | WEIGHT: 196.21 LBS | SYSTOLIC BLOOD PRESSURE: 117 MMHG | HEART RATE: 79 BPM

## 2021-09-15 DIAGNOSIS — Z3A.20 20 WEEKS GESTATION OF PREGNANCY: Primary | ICD-10-CM

## 2021-09-15 PROCEDURE — 99213 OFFICE O/P EST LOW 20 MIN: CPT | Performed by: OBSTETRICS & GYNECOLOGY

## 2021-09-15 PROCEDURE — 76805 OB US >/= 14 WKS SNGL FETUS: CPT | Performed by: OBSTETRICS & GYNECOLOGY

## 2021-09-15 PROCEDURE — 76817 TRANSVAGINAL US OBSTETRIC: CPT | Performed by: OBSTETRICS & GYNECOLOGY

## 2021-09-15 NOTE — LETTER
September 16, 2021     Bisi King, [address]    Patient: Shu Hampton   YOB: 1995   Date of Visit: 9/15/2021       Dear Dr Lakhani Ask:    Thank you for referring Rogelio Wallace to me for evaluation  Below are my notes for this consultation  If you have questions, please do not hesitate to call me  I look forward to following your patient along with you  Sincerely,        Val Trinidad MD        CC: No Recipients  Val Trinidad MD  9/16/2021  6:11 PM  Sign when Signing Visit  A fetal ultrasound was completed  See Ob procedures in Epic for an interpretation and recommendations  Do not hesitate to contact us in [facility] if you have questions  Cher Gonzalez MD, [address] [facility]  Maternal Fetal Medicine    [facility]  9/15/2021  9:41 AM  Sign when Signing Visit  Ultrasound Probe Disinfection    A transvaginal ultrasound was performed  Prior to use, disinfection was performed with High Level Disinfection Process (Trophon)  Probe serial number A1: [id number] was used        Nettie Bradford  09/15/21  9:41 AM

## 2021-09-15 NOTE — PROGRESS NOTES
Ultrasound Probe Disinfection    A transvaginal ultrasound was performed  Prior to use, disinfection was performed with High Level Disinfection Process (Trophon)  Probe serial number A1: [id number] was used        Nettie Bradford  09/15/21  9:41 AM

## 2021-09-16 PROBLEM — Z3A.20 20 WEEKS GESTATION OF PREGNANCY: Status: ACTIVE | Noted: 2021-07-26

## 2021-09-16 NOTE — PROGRESS NOTES
A fetal ultrasound was completed  See Ob procedures in Epic for an interpretation and recommendations  Do not hesitate to contact us in [facility] if you have questions  [place] Danilo Manzanares MD, [address] [employer]  Maternal Fetal Medicine

## 2021-10-05 ENCOUNTER — ROUTINE PRENATAL (OUTPATIENT)
Dept: OBGYN CLINIC | Facility: CLINIC | Age: 26
End: 2021-10-05

## 2021-10-05 VITALS
SYSTOLIC BLOOD PRESSURE: 122 MMHG | DIASTOLIC BLOOD PRESSURE: 82 MMHG | HEIGHT: 63 IN | WEIGHT: 205.4 LBS | BODY MASS INDEX: 36.39 KG/M2

## 2021-10-05 DIAGNOSIS — Z3A.23 23 WEEKS GESTATION OF PREGNANCY: Primary | ICD-10-CM

## 2021-10-05 LAB
SL AMB  POCT GLUCOSE, UA: NORMAL
SL AMB LEUKOCYTE ESTERASE,UA: NORMAL
SL AMB POCT BILIRUBIN,UA: NORMAL
SL AMB POCT BLOOD,UA: NORMAL
SL AMB POCT CLARITY,UA: NORMAL
SL AMB POCT COLOR,UA: NORMAL
SL AMB POCT KETONES,UA: NORMAL
SL AMB POCT NITRITE,UA: NORMAL
SL AMB POCT PH,UA: NORMAL
SL AMB POCT SPECIFIC GRAVITY,UA: NORMAL
SL AMB POCT URINE PROTEIN: NORMAL
SL AMB POCT UROBILINOGEN: NORMAL

## 2021-10-05 PROCEDURE — PNV: Performed by: OBSTETRICS & GYNECOLOGY

## 2021-10-26 ENCOUNTER — ROUTINE PRENATAL (OUTPATIENT)
Dept: OBGYN CLINIC | Facility: CLINIC | Age: 26
End: 2021-10-26
Payer: COMMERCIAL

## 2021-10-26 VITALS
DIASTOLIC BLOOD PRESSURE: 76 MMHG | HEIGHT: 63 IN | WEIGHT: 202 LBS | BODY MASS INDEX: 35.79 KG/M2 | SYSTOLIC BLOOD PRESSURE: 118 MMHG

## 2021-10-26 DIAGNOSIS — Z3A.26 26 WEEKS GESTATION OF PREGNANCY: Primary | ICD-10-CM

## 2021-10-26 PROCEDURE — PNV: Performed by: OBSTETRICS & GYNECOLOGY

## 2021-10-26 PROCEDURE — 90686 IIV4 VACC NO PRSV 0.5 ML IM: CPT | Performed by: OBSTETRICS & GYNECOLOGY

## 2021-10-26 PROCEDURE — 81002 URINALYSIS NONAUTO W/O SCOPE: CPT | Performed by: OBSTETRICS & GYNECOLOGY

## 2021-10-26 PROCEDURE — 90471 IMMUNIZATION ADMIN: CPT | Performed by: OBSTETRICS & GYNECOLOGY

## 2021-10-29 ENCOUNTER — APPOINTMENT (OUTPATIENT)
Dept: LAB | Facility: AMBULARY SURGERY CENTER | Age: 26
End: 2021-10-29
Attending: OBSTETRICS & GYNECOLOGY
Payer: COMMERCIAL

## 2021-10-29 DIAGNOSIS — Z3A.26 26 WEEKS GESTATION OF PREGNANCY: ICD-10-CM

## 2021-10-29 LAB
BASOPHILS # BLD AUTO: 0.05 THOUSANDS/ΜL (ref 0–0.1)
BASOPHILS NFR BLD AUTO: 0 % (ref 0–1)
EOSINOPHIL # BLD AUTO: 0.48 THOUSAND/ΜL (ref 0–0.61)
EOSINOPHIL NFR BLD AUTO: 4 % (ref 0–6)
ERYTHROCYTE [DISTWIDTH] IN BLOOD BY AUTOMATED COUNT: 13.3 % (ref 11.6–15.1)
GLUCOSE 1H P 50 G GLC PO SERPL-MCNC: 105 MG/DL (ref 40–134)
HCT VFR BLD AUTO: 36.9 % (ref 34.8–46.1)
HGB BLD-MCNC: 12.1 G/DL (ref 11.5–15.4)
IMM GRANULOCYTES # BLD AUTO: 0.21 THOUSAND/UL (ref 0–0.2)
IMM GRANULOCYTES NFR BLD AUTO: 2 % (ref 0–2)
LYMPHOCYTES # BLD AUTO: 1.75 THOUSANDS/ΜL (ref 0.6–4.47)
LYMPHOCYTES NFR BLD AUTO: 15 % (ref 14–44)
MCH RBC QN AUTO: 30.4 PG (ref 26.8–34.3)
MCHC RBC AUTO-ENTMCNC: 32.8 G/DL (ref 31.4–37.4)
MCV RBC AUTO: 93 FL (ref 82–98)
MONOCYTES # BLD AUTO: 0.9 THOUSAND/ΜL (ref 0.17–1.22)
MONOCYTES NFR BLD AUTO: 8 % (ref 4–12)
NEUTROPHILS # BLD AUTO: 8.6 THOUSANDS/ΜL (ref 1.85–7.62)
NEUTS SEG NFR BLD AUTO: 71 % (ref 43–75)
NRBC BLD AUTO-RTO: 0 /100 WBCS
PLATELET # BLD AUTO: 203 THOUSANDS/UL (ref 149–390)
PMV BLD AUTO: 9.5 FL (ref 8.9–12.7)
RBC # BLD AUTO: 3.98 MILLION/UL (ref 3.81–5.12)
RPR SER QL: NORMAL
WBC # BLD AUTO: 11.99 THOUSAND/UL (ref 4.31–10.16)

## 2021-10-29 PROCEDURE — 82950 GLUCOSE TEST: CPT

## 2021-10-29 PROCEDURE — 36415 COLL VENOUS BLD VENIPUNCTURE: CPT

## 2021-10-29 PROCEDURE — 86592 SYPHILIS TEST NON-TREP QUAL: CPT

## 2021-10-29 PROCEDURE — 85025 COMPLETE CBC W/AUTO DIFF WBC: CPT

## 2021-11-13 ENCOUNTER — OFFICE VISIT (OUTPATIENT)
Dept: URGENT CARE | Facility: CLINIC | Age: 26
End: 2021-11-13
Payer: COMMERCIAL

## 2021-11-13 VITALS
TEMPERATURE: 96.8 F | OXYGEN SATURATION: 100 % | DIASTOLIC BLOOD PRESSURE: 72 MMHG | HEIGHT: 63 IN | WEIGHT: 207 LBS | BODY MASS INDEX: 36.68 KG/M2 | HEART RATE: 105 BPM | SYSTOLIC BLOOD PRESSURE: 111 MMHG

## 2021-11-13 DIAGNOSIS — H66.92 LEFT OTITIS MEDIA, UNSPECIFIED OTITIS MEDIA TYPE: Primary | ICD-10-CM

## 2021-11-13 PROCEDURE — 99213 OFFICE O/P EST LOW 20 MIN: CPT | Performed by: PHYSICIAN ASSISTANT

## 2021-11-13 RX ORDER — AMOXICILLIN 500 MG/1
500 CAPSULE ORAL EVERY 12 HOURS SCHEDULED
Qty: 14 CAPSULE | Refills: 0 | Status: SHIPPED | OUTPATIENT
Start: 2021-11-13 | End: 2021-11-20

## 2021-11-30 ENCOUNTER — ROUTINE PRENATAL (OUTPATIENT)
Dept: OBGYN CLINIC | Facility: CLINIC | Age: 26
End: 2021-11-30

## 2021-11-30 VITALS
DIASTOLIC BLOOD PRESSURE: 78 MMHG | BODY MASS INDEX: 37.39 KG/M2 | SYSTOLIC BLOOD PRESSURE: 120 MMHG | WEIGHT: 211 LBS | HEIGHT: 63 IN

## 2021-11-30 DIAGNOSIS — Z3A.31 31 WEEKS GESTATION OF PREGNANCY: Primary | ICD-10-CM

## 2021-11-30 PROCEDURE — PNV: Performed by: OBSTETRICS & GYNECOLOGY

## 2021-12-10 ENCOUNTER — ROUTINE PRENATAL (OUTPATIENT)
Dept: OBGYN CLINIC | Facility: CLINIC | Age: 26
End: 2021-12-10

## 2021-12-10 VITALS
SYSTOLIC BLOOD PRESSURE: 118 MMHG | BODY MASS INDEX: 38.31 KG/M2 | DIASTOLIC BLOOD PRESSURE: 80 MMHG | HEIGHT: 63 IN | WEIGHT: 216.2 LBS

## 2021-12-10 DIAGNOSIS — Z34.83 ENCOUNTER FOR SUPERVISION OF NORMAL PREGNANCY IN MULTIGRAVIDA IN THIRD TRIMESTER: Primary | ICD-10-CM

## 2021-12-10 DIAGNOSIS — Z3A.33 33 WEEKS GESTATION OF PREGNANCY: Primary | ICD-10-CM

## 2021-12-10 PROBLEM — Z3A.20 20 WEEKS GESTATION OF PREGNANCY: Status: RESOLVED | Noted: 2021-07-26 | Resolved: 2021-12-10

## 2021-12-10 PROCEDURE — PNV: Performed by: PHYSICIAN ASSISTANT

## 2021-12-21 PROBLEM — O99.210 OBESITY AFFECTING PREGNANCY: Status: ACTIVE | Noted: 2021-12-21

## 2021-12-21 PROBLEM — Z3A.34 34 WEEKS GESTATION OF PREGNANCY: Status: ACTIVE | Noted: 2021-12-21

## 2021-12-22 ENCOUNTER — ULTRASOUND (OUTPATIENT)
Dept: PERINATAL CARE | Facility: OTHER | Age: 26
End: 2021-12-22
Payer: COMMERCIAL

## 2021-12-22 VITALS
SYSTOLIC BLOOD PRESSURE: 132 MMHG | HEART RATE: 99 BPM | BODY MASS INDEX: 38.12 KG/M2 | DIASTOLIC BLOOD PRESSURE: 87 MMHG | HEIGHT: 63 IN | WEIGHT: 215.17 LBS

## 2021-12-22 DIAGNOSIS — Z3A.34 34 WEEKS GESTATION OF PREGNANCY: Primary | ICD-10-CM

## 2021-12-22 DIAGNOSIS — Z36.89 ENCOUNTER FOR ULTRASOUND TO ASSESS FETAL GROWTH: ICD-10-CM

## 2021-12-22 DIAGNOSIS — O99.213 OBESITY AFFECTING PREGNANCY IN THIRD TRIMESTER: ICD-10-CM

## 2021-12-22 PROCEDURE — 76816 OB US FOLLOW-UP PER FETUS: CPT | Performed by: OBSTETRICS & GYNECOLOGY

## 2021-12-22 PROCEDURE — 99213 OFFICE O/P EST LOW 20 MIN: CPT | Performed by: OBSTETRICS & GYNECOLOGY

## 2022-01-10 ENCOUNTER — ROUTINE PRENATAL (OUTPATIENT)
Dept: OBGYN CLINIC | Facility: CLINIC | Age: 27
End: 2022-01-10
Payer: COMMERCIAL

## 2022-01-10 VITALS
BODY MASS INDEX: 38.88 KG/M2 | SYSTOLIC BLOOD PRESSURE: 120 MMHG | DIASTOLIC BLOOD PRESSURE: 82 MMHG | HEIGHT: 63 IN | WEIGHT: 219.4 LBS

## 2022-01-10 DIAGNOSIS — Z34.83 ENCOUNTER FOR SUPERVISION OF NORMAL PREGNANCY IN MULTIGRAVIDA IN THIRD TRIMESTER: Primary | ICD-10-CM

## 2022-01-10 DIAGNOSIS — Z36.9 ENCOUNTER FOR ANTENATAL SCREENING: ICD-10-CM

## 2022-01-10 DIAGNOSIS — Z3A.37 37 WEEKS GESTATION OF PREGNANCY: ICD-10-CM

## 2022-01-10 LAB
SL AMB  POCT GLUCOSE, UA: NORMAL
SL AMB LEUKOCYTE ESTERASE,UA: NORMAL
SL AMB POCT BILIRUBIN,UA: NORMAL
SL AMB POCT BLOOD,UA: NORMAL
SL AMB POCT CLARITY,UA: CLEAR
SL AMB POCT COLOR,UA: YELLOW
SL AMB POCT KETONES,UA: NORMAL
SL AMB POCT NITRITE,UA: NORMAL
SL AMB POCT PH,UA: NORMAL
SL AMB POCT SPECIFIC GRAVITY,UA: NORMAL
SL AMB POCT URINE PROTEIN: NORMAL
SL AMB POCT UROBILINOGEN: NORMAL

## 2022-01-10 PROCEDURE — PNV: Performed by: PHYSICIAN ASSISTANT

## 2022-01-10 PROCEDURE — 87150 DNA/RNA AMPLIFIED PROBE: CPT | Performed by: PHYSICIAN ASSISTANT

## 2022-01-10 PROCEDURE — 81002 URINALYSIS NONAUTO W/O SCOPE: CPT | Performed by: PHYSICIAN ASSISTANT

## 2022-01-10 NOTE — PATIENT INSTRUCTIONS
Stressed kick counts  Continue PNV  Continue Reglan as needed  Continue Tums as needed  Discussed labor precautions

## 2022-01-10 NOTE — PROGRESS NOTES
Assessment     Pregnancy 37 and 3/7 weeks     Plan     Plans for delivery: Vaginal anticipated  Beta strep culture: done  Counseling: L&D discussion: symptoms of labor and discussed when to call  Fetal testing: N/A  Follow up in 1 Week  GBS culture done  Stressed kick counts  Continue PNV  Continue Tums as needed  Continue Reglan as needed  Discussed labor precautions  Fernanda Bahena is a 32 y o  female being seen today for her obstetrical visit  She is at 37w3d gestation  Patient reports heartburn, no bleeding, no leaking and occasional contractions  Fetal movement: normal   Patient states she is doing well overall  Has recovered from Covid  U/S on  showed appropriate fetal growth; no f/u necessary  Experiencing irregular BH contractions  Taking Tums for reflux  Stopped daily aspirin at 36 wks  Only needed Reglan once for nausea last week  Denies HA, abdominal pain, and swelling  Urine dip negative  Menstrual History:  OB History        2    Para        Term                AB   1    Living   0       SAB   0    IAB        Ectopic   0    Multiple        Live Births   0                Menarche age: N/A  Patient's last menstrual period was 2021 (exact date)  The following portions of the patient's history were reviewed and updated as appropriate: allergies, current medications, past family history, past medical history, past social history, past surgical history and problem list     Review of Systems  Pertinent items are noted in HPI       Objective     /82 (BP Location: Left arm, Patient Position: Sitting, Cuff Size: Large)   Ht 5' 3" (1 6 m)   Wt 99 5 kg (219 lb 6 4 oz)   LMP 2021 (Exact Date)   BMI 38 86 kg/m²   FHT: 135 BPM   Uterine Size: size equals dates   Presentations: unsure   Pelvic Exam:

## 2022-01-13 LAB — GP B STREP DNA SPEC QL NAA+PROBE: NEGATIVE

## 2022-01-14 ENCOUNTER — TELEPHONE (OUTPATIENT)
Dept: OBGYN CLINIC | Facility: CLINIC | Age: 27
End: 2022-01-14

## 2022-01-14 NOTE — TELEPHONE ENCOUNTER
Spoke with the patient and , made aware her disability papers work was faxed to [place], and a copy was put on her chart as well as a copy sent in the mail to her

## 2022-01-20 ENCOUNTER — NURSE TRIAGE (OUTPATIENT)
Dept: OTHER | Facility: OTHER | Age: 27
End: 2022-01-20

## 2022-01-21 ENCOUNTER — ROUTINE PRENATAL (OUTPATIENT)
Dept: OBGYN CLINIC | Facility: CLINIC | Age: 27
End: 2022-01-21

## 2022-01-21 VITALS
WEIGHT: 222.6 LBS | DIASTOLIC BLOOD PRESSURE: 80 MMHG | BODY MASS INDEX: 39.44 KG/M2 | HEIGHT: 63 IN | SYSTOLIC BLOOD PRESSURE: 122 MMHG

## 2022-01-21 DIAGNOSIS — Z3A.39 39 WEEKS GESTATION OF PREGNANCY: Primary | ICD-10-CM

## 2022-01-21 PROCEDURE — PNV: Performed by: OBSTETRICS & GYNECOLOGY

## 2022-01-21 NOTE — TELEPHONE ENCOUNTER
Reason for Disposition   [1] First baby (primipara) AND [2] contractions > 5 minutes apart, or for < 2 hours    Answer Assessment - Initial Assessment Questions  1  ONSET: "When did the symptoms begin?"         Two hours  2  CONTRACTIONS: "Describe the contractions that you are having " (e g , duration, frequency, regularity, severity)      Yes it is happening  q 10 minutes over the last 2  hours  3  DEBBI: "What date are you expecting to deliver?"      39 weeks  4  PARITY: "Have you had a baby before?" If Yes, ask: "How long did the labor last?"      first  5  FETAL MOVEMENT: "Has the baby's movement decreased or changed significantly from normal?"      Baby is moving well  6  OTHER SYMPTOMS: "Do you have any other symptoms?" (e g , leaking fluid from vagina, vaginal bleeding, fever, hand/facial swelling)      Lower back and buttock pressure  No ruptured membranes, no vaginal bleeding      Protocols used: PREGNANCY - LABOR-ADULT-

## 2022-01-21 NOTE — TELEPHONE ENCOUNTER
Regardin weeks back pain  ----- Message from Christi Yao sent at 2022  7:05 PM EST -----  " I am having a lot of back pain and pressure in lower back and butt, it comes and goes   I am 39 weeks  "

## 2022-01-21 NOTE — PROGRESS NOTES
Patient seen evaluated denies any vaginal bleeding ruptures of membrane or contraction, having good fetal movement, denies any headache blurry vision or epigastric pain, GBS negative results discussed with patient, patient's continue taking prenatal vitamin daily, patient was having contraction every 10 minute last night subsided this morning pelvic exam performed cervix 1/50/-2 posterior, finding discussed with patient, labor instruction given, fetal kick count discuss, sign and symptom of preeclampsia review, to return to office in 1 week bedside ultrasound performed confirm vertex presentation

## 2022-01-26 ENCOUNTER — ROUTINE PRENATAL (OUTPATIENT)
Dept: OBGYN CLINIC | Facility: CLINIC | Age: 27
End: 2022-01-26

## 2022-01-26 ENCOUNTER — TELEPHONE (OUTPATIENT)
Dept: OBGYN CLINIC | Facility: CLINIC | Age: 27
End: 2022-01-26

## 2022-01-26 VITALS
DIASTOLIC BLOOD PRESSURE: 84 MMHG | SYSTOLIC BLOOD PRESSURE: 122 MMHG | WEIGHT: 220 LBS | HEIGHT: 63 IN | BODY MASS INDEX: 38.98 KG/M2

## 2022-01-26 DIAGNOSIS — Z3A.39 39 WEEKS GESTATION OF PREGNANCY: ICD-10-CM

## 2022-01-26 DIAGNOSIS — O48.0 POST-TERM PREGNANCY, 40-42 WEEKS OF GESTATION: Primary | ICD-10-CM

## 2022-01-26 PROCEDURE — PNV: Performed by: OBSTETRICS & GYNECOLOGY

## 2022-01-26 NOTE — PROGRESS NOTES
Patient seen evaluated denies any vaginal bleeding ruptures of membrane or contraction, complaining of mild lower back pain, denies any headache blurry vision or epigastric pain, pelvic exam performed cervix 1-2/50/-2 vertex presentation, recommend MFM for IRAIS if not deliver by [facility], then induction of labor next week if not deliver L&D notified, to continue prenatal vitamin daily, labor instruction given, fetal kick count discuss, sign and symptom of preeclampsia review, to return to office in 1 week

## 2022-01-26 NOTE — TELEPHONE ENCOUNTER
Spoke with Labor/Delivery [facility], as per Dr Oracio Grier, induction  February 2 , 2022 8pm into Thurs 2/3/2022, they only have 7am available, they do have Thurs 2/3/2022  Bal,loon 8 pm into  Friday 2/4/2022    Also spoke with MFM, SANTINO/Tereza scheduled for Thurs 1/27/2022 @ 8:45am

## 2022-01-27 ENCOUNTER — ULTRASOUND (OUTPATIENT)
Dept: PERINATAL CARE | Facility: OTHER | Age: 27
End: 2022-01-27
Payer: COMMERCIAL

## 2022-01-27 VITALS
HEART RATE: 91 BPM | BODY MASS INDEX: 39.22 KG/M2 | HEIGHT: 63 IN | WEIGHT: 221.34 LBS | DIASTOLIC BLOOD PRESSURE: 79 MMHG | SYSTOLIC BLOOD PRESSURE: 116 MMHG

## 2022-01-27 DIAGNOSIS — Z3A.39 39 WEEKS GESTATION OF PREGNANCY: ICD-10-CM

## 2022-01-27 DIAGNOSIS — O99.213 OBESITY AFFECTING PREGNANCY IN THIRD TRIMESTER: Primary | ICD-10-CM

## 2022-01-27 DIAGNOSIS — O48.0 POST-TERM PREGNANCY, 40-42 WEEKS OF GESTATION: ICD-10-CM

## 2022-01-27 PROCEDURE — 59025 FETAL NON-STRESS TEST: CPT | Performed by: OBSTETRICS & GYNECOLOGY

## 2022-01-27 PROCEDURE — 76815 OB US LIMITED FETUS(S): CPT | Performed by: OBSTETRICS & GYNECOLOGY

## 2022-01-27 PROCEDURE — NC001 PR NO CHARGE: Performed by: OBSTETRICS & GYNECOLOGY

## 2022-01-27 NOTE — LETTER
NST sleeve cover sheet    Patient name: Paulette Harris  : 1995  MRN: [id number]    DEBBI: Estimated Date of Delivery: 22    Obstetrician:  Kendra Payton     Reason(s) for testing:  __________________________________________      Testing frequency:    ___ 2x/wk  ___ 1x/wk  ___ Dopplers  ___ BPP?       Last growth scan: __________________________________________

## 2022-01-27 NOTE — PATIENT INSTRUCTIONS
Nonstress Test for Pregnancy   WHAT YOU NEED TO KNOW:   What do I need to know about a nonstress test?  A nonstress test measures your baby's heart rate and movements  Nonstress means that no stress will be placed on your baby during the test   How do I prepare for a nonstress test?  Your healthcare provider will talk to you about how to prepare for this test  He or she may tell you to eat and drink plenty of fluids before your test  If you smoke, you may be asked not to smoke within 2 hours before the test  He or she will also tell you which medicines to take or not take on the day of your test   What will happen during a nonstress test?  You may be asked to lie down or recline back for the test on a bed  One or 2 belts with sensors will be placed around your abdomen  Your baby's heart rate will be recorded with a machine  If your baby does not move, your baby may be asleep  Your healthcare provider may make a noise near your abdomen to try to wake your baby  The test usually takes about 20 minutes, but can take longer if your baby needs to be awakened  What do I need to know about the test results? Your baby will be expected to move at least 2 times for a certain amount of time  Your baby's heart rate will be expected to go up by a certain number of beats per minute during movement  If your baby does not move as expected, the test may need to be repeated or you may need other tests  CARE AGREEMENT:   You have the right to help plan your care  Learn about your health condition and how it may be treated  Discuss treatment options with your healthcare providers to decide what care you want to receive  You always have the right to refuse treatment  The above information is an  only  It is not intended as medical advice for individual conditions or treatments  Talk to your doctor, nurse or pharmacist before following any medical regimen to see if it is safe and effective for you    © Copyright Standard[employer] 2021 Information is for [employer] use only and may not be sold, redistributed or otherwise used for commercial purposes  All illustrations and images included in CareNotes® are the copyrighted property of Charlotte GOMEZ  or [address] [place] [address] Jordan Hubbard in Pregnancy   AMBULATORY CARE:   Kick counts  measure how much your baby is moving in your womb  A kick from your baby can be felt as a twist, turn, swish, roll, or jab  It is common to feel your baby kicking at 26 to 28 weeks of pregnancy  You may feel your baby kick as early as 20 weeks of pregnancy  You may want to start counting at 28 weeks  Contact your doctor immediately if:   · You feel a change in the number of kicks or movements of your baby  · You feel fewer than 10 kicks within 2 hours  · You have questions or concerns about your baby's movements  Why measure kick counts:  Your baby's movement may provide information about your baby's health  He or she may move less, or not at all, if there are problems  Your baby may move less if he or she is not getting enough oxygen or nutrition from the placenta  Do not smoke while you are pregnant  Smoking decreases the amount of oxygen that gets to your baby  Talk to your healthcare provider if you need help to quit smoking  Tell your healthcare provider as soon as you feel a change in your baby's movements  When to measure kick counts:   · Measure kick counts at the same time every day  · Measure kick counts when your baby is awake and most active  Your baby may be most active in the evening  How to measure kick counts:  Check that your baby is awake before you measure kick counts  You can wake up your baby by lightly pushing on your belly, walking, or drinking something cold  Your healthcare provider may tell you different ways to measure kick counts  You may be told to do the following:  · Use a chart or clock to keep track of the time you start and finish counting       · Sit in a chair or lie on your left side  · Place your hands on the largest part of your belly  · Count until you reach 10 kicks  Write down how much time it takes to count 10 kicks  · It may take 30 minutes to 2 hours to count 10 kicks  It should not take more than 2 hours to count 10 kicks  Follow up with your doctor as directed:  Write down your questions so you remember to ask them during your visits  © Copyright [employer] 2021 Information is for End User's use only and may not be sold, redistributed or otherwise used for commercial purposes  All illustrations and images included in CareNotes® are the copyrighted property of A D A M , Inc  or [facility] Kenji Ghotra   The above information is an  only  It is not intended as medical advice for individual conditions or treatments  Talk to your doctor, nurse or pharmacist before following any medical regimen to see if it is safe and effective for you

## 2022-01-27 NOTE — PROGRESS NOTES
Non-Stress Testing:    Non-Stress test, equipment, procedure, and expected outcomes reviewed  Reviewed fetal kick counts and when to call OB  Jason Livingston Verified patient understanding of fetal kick counts with teach back method  Patient reports feeling daily fetal movements  Patient has no questions or concerns  Dr Luis Newberry viewed NST strip prior to completion of visit

## 2022-01-28 ENCOUNTER — NURSE TRIAGE (OUTPATIENT)
Dept: OTHER | Facility: OTHER | Age: 27
End: 2022-01-28

## 2022-01-28 ENCOUNTER — HOSPITAL ENCOUNTER (OUTPATIENT)
Facility: HOSPITAL | Age: 27
Discharge: HOME | End: 2022-01-28
Attending: OBSTETRICS & GYNECOLOGY | Admitting: OBSTETRICS & GYNECOLOGY
Payer: COMMERCIAL

## 2022-01-28 VITALS
TEMPERATURE: 98.4 F | SYSTOLIC BLOOD PRESSURE: 118 MMHG | WEIGHT: 221.34 LBS | DIASTOLIC BLOOD PRESSURE: 68 MMHG | HEIGHT: 63 IN | RESPIRATION RATE: 20 BRPM | HEART RATE: 89 BPM | BODY MASS INDEX: 39.22 KG/M2

## 2022-01-28 PROBLEM — Z3A.40 40 WEEKS GESTATION OF PREGNANCY: Status: ACTIVE | Noted: 2021-12-21

## 2022-01-28 PROCEDURE — NC001 PR NO CHARGE: Performed by: STUDENT IN AN ORGANIZED HEALTH CARE EDUCATION/TRAINING PROGRAM

## 2022-01-28 PROCEDURE — 76815 OB US LIMITED FETUS(S): CPT | Performed by: STUDENT IN AN ORGANIZED HEALTH CARE EDUCATION/TRAINING PROGRAM

## 2022-01-28 PROCEDURE — 99213 OFFICE O/P EST LOW 20 MIN: CPT

## 2022-01-28 NOTE — TELEPHONE ENCOUNTER
Regarding: Leaking Fluid - Possible Labor  ----- Message from Reta Adams sent at 1/28/2022  4:46 PM EST -----  "I was told by the office to go in as I may be in labor because I am leaking, however, I wasn't sure should I go to [facility] or go into the ER?"

## 2022-01-28 NOTE — TELEPHONE ENCOUNTER
Reason for Disposition   Leakage of fluid from vagina    Answer Assessment - Initial Assessment Questions  1  ONSET: "When did the symptoms begin?"         Since last night  2  CONTRACTIONS: "Are you having any contractions?" If yes, ask: "Describe the contractions that you are having " (e g , duration, frequency, regularity, severity)      Mild belly cramping  3  DEBBI: "What date are you expecting to deliver?"      Today- 1/28    4  PARITY: "Have you had a baby before?" If Yes, ask: "How long did the labor last?"      2nd pregnancy  5  FETAL MOVEMENT: "Has the baby's movement decreased or changed significantly from normal?"      Reports normal movement  6  OTHER SYMPTOMS: "Do you have any other symptoms?" (e g , abdominal pain, vaginal bleeding, fever, hand/facial swelling)      No vaginal bleeding  Noticed some fluid leakage last night and a few mins ago a gush of fluid      Protocols used: [place] [facility] [place]

## 2022-01-28 NOTE — PROGRESS NOTES
OB Triage Note  Mackenzie Asa 32 y o  female MRN: [id number]  Unit/Bed#: LD TRIAGE 3-01     DEBBI: Estimated Date of Delivery: 22    HPI: 32 y o   at 40w0d with c/o contractions and leaking fluid  She complains of intermittent uterine contractions, moderate LOF, and no VB  She states she has felt good FM  Patient states that she has been having scant LOF since yesterday and that around 3:30 pm today she had a gush a clear, odorless fluid come down her legs spontaneously  She also mentions having occasional minor abdominal cramping pain since yesterday  Patient is scheduled for an induction of labor on 22 at 4 pm  She was seen in her [address] office last Wednesday (2022)  Her membranes were stripped and she was told that she was 1 5 cm dilated ( / -2)  Patient has past Obstetrical history of one IAB when she was 23  Has PMH of asthma  Denies any complications or abnormalities throughout this pregnancy  States no substance use, no smoking and no alcohol consumption  If in labor, she would like an epidural       OB Cx: None    NST:    Baseline: 140 BPM  Variability: Moderate 6-25 bpm  Accelerations: Yes   Decelerations: No   Interpretation: Reactive     TOCO:     Contractions: Every 2 - 5 minutes     IRAIS:    4 Quadrants  LVP (cm): 6 cm     Vitals:     Temp:  [98 4 °F (36 9 °C)] 98 4 °F (36 9 °C)  HR:  [85-89] 89  Resp:  [20] 20  BP: (118-138)/(68-79) 118/68    Physical Exam:    General: AAOX3  No acute distress   Abdominal: Soft  NT/ND  Gravid  : Speculum (No pooling; No VB: Os visually closed; Scant white liquid discharge); Microscopy (No Ferning)  SVE: 1  / -3    A/P:  at 40w0d here for rule out spontaneous rupture of membranes  After evaluation, patient did not have any pooling of fluid, no ferning under microscopy and Nitrazine was negative  SROM was ruled out   SVE was 1  / -3, showing minor change in effacement but no further dilation from her last cervical check in the office on 1/26/2022 (1-2 / 50 / -2)  NST was reactive and reassuring  IRAIS was checked in all 4 quadrants and her LVP was 6 cm  Patient was comfortable to go home  · Patient was discharged home with precautions  Was told to return to ED if any VB, further increase of LOF, constant and worsening contractions, and if baby was not moving as usual      Dr Triny Mckeon aware  Rimma Hopkins MD , MSMS     [address]     Date: 1/28/2022  Time: 8:55 PM

## 2022-01-29 NOTE — DISCHARGE INSTRUCTIONS
Pregnancy at 44 to 40 Weeks   [address] [id number] :   What changes are happening with my body? You are now getting close to your due date  Your due date is just an estimate of when your baby will be born  Your baby may be born before or after your due date  Your breathing may be easier if your baby has moved down into a head-down position  You may need to urinate more often because the baby may be pressing on your bladder  You may also feel more discomfort and tire easily  You may also be having trouble sleeping  How do I care for myself at this stage of my pregnancy? · Eat a variety of healthy foods  Healthy foods include fruits, vegetables, whole-grain breads, low-fat dairy foods, beans, lean meats, and fish  Drink liquids as directed  Ask how much liquid to drink each day and which liquids are best for you  Limit caffeine to less than 200 milligrams each day  Limit your intake of fish to 2 servings each week  Choose fish low in mercury such as canned light tuna, shrimp, salmon, cod, or tilapia  Do not  eat fish high in mercury such as swordfish, tilefish, mami mackerel, and shark  · Take prenatal vitamins as directed  Your need for certain vitamins and minerals, such as folic acid, increases during pregnancy  Prenatal vitamins provide some of the extra vitamins and minerals you need  Prenatal vitamins may also help to decrease the risk of certain birth defects  · Rest as needed  Put your feet up if you have swelling in your ankles and feet  · Talk to your healthcare provider about exercise  Moderate exercise can help you stay fit  Your healthcare provider will help you plan an exercise program that is safe for you during pregnancy  · Do not smoke  Smoking increases your risk of a miscarriage and other health problems during your pregnancy  Smoking can cause your baby to be born early or weigh less at birth   Ask your healthcare provider for information if you need help quitting  · Do not drink alcohol  Alcohol passes from your body to your baby through the placenta  It can affect your baby's brain development and cause fetal alcohol syndrome (FAS)  FAS is a group of conditions that causes mental, behavior, and growth problems  · Talk to your healthcare provider before you take any medicines  Many medicines may harm your baby if you take them when you are pregnant  Do not take any medicines, vitamins, herbs, or supplements without first talking to your healthcare provider  Never use illegal or street drugs (such as marijuana or cocaine) while you are pregnant  What are some safety tips during pregnancy? · Avoid hot tubs and saunas  Do not use a hot tub or sauna while you are pregnant, especially during your first trimester  Hot tubs and saunas may raise your baby's temperature and increase the risk of birth defects  · Avoid toxoplasmosis  This is an infection caused by eating raw meat or being around infected cat feces  It can cause birth defects, miscarriages, and other problems  Wash your hands after you touch raw meat  Make sure any meat is well-cooked before you eat it  Avoid raw eggs and unpasteurized milk  Use gloves or ask someone else to clean your cat's litter box while you are pregnant  · Ask your healthcare provider about travel  The most comfortable time to travel is during the second trimester  Ask your provider if you can travel after 36 weeks  You may not be able to travel in an airplane after 36 weeks  He or she may also recommend that you avoid long road trips  What changes are happening with my baby? Your baby is ready to be born  At birth, your baby may weigh about 6 to 9 pounds and be about 19 to 21 inches long  Your baby may be in a head-down position  Your baby will also rest lower in your abdomen  What do I need to know about prenatal care? Your healthcare provider will check your blood pressure and weight   You may also need the following:  · A urine test  may also be done to check for sugar and protein  These can be signs of gestational diabetes or infection  Protein in your urine may also be a sign of preeclampsia  Preeclampsia is a condition that can develop during week 20 or later of your pregnancy  It causes high blood pressure, and it can cause problems with your kidneys and other organs  · Your baby's heart rate  will be checked  When should I seek immediate care? · You develop a severe headache that does not go away  · You have new or increased vision changes, such as blurred or spotted vision  · You have new or increased swelling in your face or hands  · You have vaginal spotting or bleeding  · Your water broke or you feel warm water gushing or trickling from your vagina  When should I call my obstetrician? · You have more than 5 contractions in 1 hour  · You notice any changes in your baby's movements  · You have abdominal cramps, pressure, or tightening  · You have a change in vaginal discharge  · You have chills or a fever  · You have vaginal itching, burning, or pain  · You have yellow, green, white, or foul-smelling vaginal discharge  · You have pain or burning when you urinate, less urine than usual, or pink or bloody urine  · You have questions or concerns about your condition or care  CARE AGREEMENT:   You have the right to help plan your care  Learn about your health condition and how it may be treated  Discuss treatment options with your healthcare providers to decide what care you want to receive  You always have the right to refuse treatment  The above information is an  only  It is not intended as medical advice for individual conditions or treatments  Talk to your doctor, nurse or pharmacist before following any medical regimen to see if it is safe and effective for you    © Copyright [employer] 2021 Information is for End User's use only and may not be sold, redistributed or otherwise used for commercial purposes   All illustrations and images included in CareNotes® are the copyrighted property of A D A M , Inc  or [facility] [address]

## 2022-01-29 NOTE — PROCEDURES
inna Quinones at 40w0d with an DEBBI of 1/28/2022, by Ultrasound, was seen at [address] for the following procedure(s): $Procedure Type: IRAIS]         4 Quadrant IRAIS  LVP (cm): 6 cm              Interpretation  Nonstress Test Interpretation: Reactive

## 2022-02-01 ENCOUNTER — ROUTINE PRENATAL (OUTPATIENT)
Dept: OBGYN CLINIC | Facility: CLINIC | Age: 27
End: 2022-02-01
Payer: COMMERCIAL

## 2022-02-01 VITALS
HEIGHT: 63 IN | WEIGHT: 221 LBS | SYSTOLIC BLOOD PRESSURE: 122 MMHG | DIASTOLIC BLOOD PRESSURE: 82 MMHG | BODY MASS INDEX: 39.16 KG/M2

## 2022-02-01 DIAGNOSIS — Z3A.40 40 WEEKS GESTATION OF PREGNANCY: Primary | ICD-10-CM

## 2022-02-01 PROCEDURE — 59025 FETAL NON-STRESS TEST: CPT | Performed by: OBSTETRICS & GYNECOLOGY

## 2022-02-01 PROCEDURE — PNV: Performed by: OBSTETRICS & GYNECOLOGY

## 2022-02-01 NOTE — PROGRESS NOTES
Patient seen evaluated denies any vaginal bleeding ruptures of membrane or contraction, having good fetal movement, denies any headache blurry vision or epigastric pain, scheduled for induction of labor this week, NST performed today toco irregular contraction fetal heart rate 150 moderate variability reactive category 1, complaining of increased vaginal discharge speculum apply pulling negative ferning negative nitrazine negative, pelvic exam performed cervix 1-2/50/-2, labor instruction given, fetal kick count discuss, sign and symptom of preeclampsia review, all questions answered and patient was satisfied

## 2022-02-02 ENCOUNTER — ANESTHESIA EVENT (INPATIENT)
Dept: ANESTHESIOLOGY | Facility: HOSPITAL | Age: 27
End: 2022-02-02
Payer: COMMERCIAL

## 2022-02-02 ENCOUNTER — HOSPITAL ENCOUNTER (INPATIENT)
Facility: HOSPITAL | Age: 27
LOS: 2 days | Discharge: HOME | End: 2022-02-04
Attending: OBSTETRICS & GYNECOLOGY | Admitting: OBSTETRICS & GYNECOLOGY
Payer: COMMERCIAL

## 2022-02-02 ENCOUNTER — ANESTHESIA (INPATIENT)
Dept: ANESTHESIOLOGY | Facility: HOSPITAL | Age: 27
End: 2022-02-02
Payer: COMMERCIAL

## 2022-02-02 ENCOUNTER — HOSPITAL ENCOUNTER (OUTPATIENT)
Dept: LABOR AND DELIVERY | Facility: HOSPITAL | Age: 27
Discharge: HOME | End: 2022-02-02
Payer: COMMERCIAL

## 2022-02-02 DIAGNOSIS — Z3A.39 39 WEEKS GESTATION OF PREGNANCY: ICD-10-CM

## 2022-02-02 DIAGNOSIS — Z3A.40 40 WEEKS GESTATION OF PREGNANCY: ICD-10-CM

## 2022-02-02 LAB
ABO GROUP BLD: NORMAL
ALBUMIN SERPL BCP-MCNC: 2.8 G/DL (ref 3.5–5)
ALP SERPL-CCNC: 196 U/L (ref 46–116)
ALT SERPL W P-5'-P-CCNC: 21 U/L (ref 12–78)
ANION GAP SERPL CALCULATED.3IONS-SCNC: 7 MMOL/L (ref 4–13)
AST SERPL W P-5'-P-CCNC: 18 U/L (ref 5–45)
BASOPHILS # BLD AUTO: 0.05 THOUSANDS/ΜL (ref 0–0.1)
BASOPHILS NFR BLD AUTO: 0 % (ref 0–1)
BILIRUB SERPL-MCNC: 0.28 MG/DL (ref 0.2–1)
BLD GP AB SCN SERPL QL: NEGATIVE
BUN SERPL-MCNC: 10 MG/DL (ref 5–25)
CALCIUM ALBUM COR SERPL-MCNC: 10.2 MG/DL (ref 8.3–10.1)
CALCIUM SERPL-MCNC: 9.2 MG/DL (ref 8.3–10.1)
CHLORIDE SERPL-SCNC: 104 MMOL/L (ref 100–108)
CO2 SERPL-SCNC: 22 MMOL/L (ref 21–32)
CREAT SERPL-MCNC: 0.65 MG/DL (ref 0.6–1.3)
CREAT UR-MCNC: 37 MG/DL
EOSINOPHIL # BLD AUTO: 0.32 THOUSAND/ΜL (ref 0–0.61)
EOSINOPHIL NFR BLD AUTO: 3 % (ref 0–6)
ERYTHROCYTE [DISTWIDTH] IN BLOOD BY AUTOMATED COUNT: 13.4 % (ref 11.6–15.1)
GFR SERPL CREATININE-BSD FRML MDRD: 122 ML/MIN/1.73SQ M
GLUCOSE SERPL-MCNC: 94 MG/DL (ref 65–140)
HCT VFR BLD AUTO: 39.9 % (ref 34.8–46.1)
HGB BLD-MCNC: 13.4 G/DL (ref 11.5–15.4)
HIV 1+2 AB+HIV1 P24 AG SERPL QL IA: NORMAL
HIV1 P24 AG SER QL: NORMAL
IMM GRANULOCYTES # BLD AUTO: 0.14 THOUSAND/UL (ref 0–0.2)
IMM GRANULOCYTES NFR BLD AUTO: 1 % (ref 0–2)
LYMPHOCYTES # BLD AUTO: 2.17 THOUSANDS/ΜL (ref 0.6–4.47)
LYMPHOCYTES NFR BLD AUTO: 19 % (ref 14–44)
MCH RBC QN AUTO: 30.1 PG (ref 26.8–34.3)
MCHC RBC AUTO-ENTMCNC: 33.6 G/DL (ref 31.4–37.4)
MCV RBC AUTO: 90 FL (ref 82–98)
MONOCYTES # BLD AUTO: 0.93 THOUSAND/ΜL (ref 0.17–1.22)
MONOCYTES NFR BLD AUTO: 8 % (ref 4–12)
NEUTROPHILS # BLD AUTO: 8.04 THOUSANDS/ΜL (ref 1.85–7.62)
NEUTS SEG NFR BLD AUTO: 69 % (ref 43–75)
NRBC BLD AUTO-RTO: 0 /100 WBCS
PLATELET # BLD AUTO: 229 THOUSANDS/UL (ref 149–390)
PMV BLD AUTO: 10 FL (ref 8.9–12.7)
POTASSIUM SERPL-SCNC: 4.1 MMOL/L (ref 3.5–5.3)
PROT SERPL-MCNC: 6.8 G/DL (ref 6.4–8.2)
PROT UR-MCNC: <6 MG/DL
PROT/CREAT UR: <0.16 MG/G{CREAT} (ref 0–0.1)
RBC # BLD AUTO: 4.45 MILLION/UL (ref 3.81–5.12)
RH BLD: POSITIVE
SODIUM SERPL-SCNC: 133 MMOL/L (ref 136–145)
SPECIMEN EXPIRATION DATE: NORMAL
WBC # BLD AUTO: 11.65 THOUSAND/UL (ref 4.31–10.16)

## 2022-02-02 PROCEDURE — NC001 PR NO CHARGE: Performed by: OBSTETRICS & GYNECOLOGY

## 2022-02-02 PROCEDURE — 87806 HIV AG W/HIV1&2 ANTB W/OPTIC: CPT

## 2022-02-02 PROCEDURE — 86850 RBC ANTIBODY SCREEN: CPT | Performed by: OBSTETRICS & GYNECOLOGY

## 2022-02-02 PROCEDURE — 3E033VJ INTRODUCTION OF OTHER HORMONE INTO PERIPHERAL VEIN, PERCUTANEOUS APPROACH: ICD-10-PCS | Performed by: OBSTETRICS & GYNECOLOGY

## 2022-02-02 PROCEDURE — 82570 ASSAY OF URINE CREATININE: CPT

## 2022-02-02 PROCEDURE — 86901 BLOOD TYPING SEROLOGIC RH(D): CPT | Performed by: OBSTETRICS & GYNECOLOGY

## 2022-02-02 PROCEDURE — 85025 COMPLETE CBC W/AUTO DIFF WBC: CPT | Performed by: OBSTETRICS & GYNECOLOGY

## 2022-02-02 PROCEDURE — 80053 COMPREHEN METABOLIC PANEL: CPT

## 2022-02-02 PROCEDURE — 86900 BLOOD TYPING SEROLOGIC ABO: CPT | Performed by: OBSTETRICS & GYNECOLOGY

## 2022-02-02 PROCEDURE — 86592 SYPHILIS TEST NON-TREP QUAL: CPT | Performed by: OBSTETRICS & GYNECOLOGY

## 2022-02-02 PROCEDURE — 84156 ASSAY OF PROTEIN URINE: CPT

## 2022-02-02 PROCEDURE — 3E0DXGC INTRODUCTION OF OTHER THERAPEUTIC SUBSTANCE INTO MOUTH AND PHARYNX, EXTERNAL APPROACH: ICD-10-PCS | Performed by: OBSTETRICS & GYNECOLOGY

## 2022-02-02 RX ORDER — OXYTOCIN/RINGER'S LACTATE 30/500 ML
1-30 PLASTIC BAG, INJECTION (ML) INTRAVENOUS
Status: DISCONTINUED | OUTPATIENT
Start: 2022-02-02 | End: 2022-02-03

## 2022-02-02 RX ORDER — PROMETHAZINE HYDROCHLORIDE 25 MG/ML
25 INJECTION, SOLUTION INTRAMUSCULAR; INTRAVENOUS ONCE
Status: DISCONTINUED | OUTPATIENT
Start: 2022-02-02 | End: 2022-02-02

## 2022-02-02 RX ORDER — PROMETHAZINE HYDROCHLORIDE 25 MG/ML
25 INJECTION, SOLUTION INTRAMUSCULAR; INTRAVENOUS EVERY 6 HOURS PRN
Status: DISCONTINUED | OUTPATIENT
Start: 2022-02-02 | End: 2022-02-02

## 2022-02-02 RX ORDER — ONDANSETRON 2 MG/ML
4 INJECTION INTRAMUSCULAR; INTRAVENOUS EVERY 6 HOURS PRN
Status: DISCONTINUED | OUTPATIENT
Start: 2022-02-02 | End: 2022-02-03

## 2022-02-02 RX ORDER — SODIUM CHLORIDE, SODIUM LACTATE, POTASSIUM CHLORIDE, CALCIUM CHLORIDE 600; 310; 30; 20 MG/100ML; MG/100ML; MG/100ML; MG/100ML
125 INJECTION, SOLUTION INTRAVENOUS CONTINUOUS
Status: DISCONTINUED | OUTPATIENT
Start: 2022-02-02 | End: 2022-02-04 | Stop reason: HOSPADM

## 2022-02-02 RX ORDER — PROMETHAZINE HYDROCHLORIDE 25 MG/ML
25 INJECTION, SOLUTION INTRAMUSCULAR; INTRAVENOUS ONCE
Status: COMPLETED | OUTPATIENT
Start: 2022-02-02 | End: 2022-02-02

## 2022-02-02 RX ORDER — BUTORPHANOL TARTRATE 1 MG/ML
1 INJECTION, SOLUTION INTRAMUSCULAR; INTRAVENOUS ONCE
Status: COMPLETED | OUTPATIENT
Start: 2022-02-02 | End: 2022-02-02

## 2022-02-02 RX ORDER — ALBUTEROL SULFATE 90 UG/1
2 AEROSOL, METERED RESPIRATORY (INHALATION) EVERY 6 HOURS PRN
Status: DISCONTINUED | OUTPATIENT
Start: 2022-02-02 | End: 2022-02-04 | Stop reason: HOSPADM

## 2022-02-02 RX ADMIN — SODIUM CHLORIDE, SODIUM LACTATE, POTASSIUM CHLORIDE, AND CALCIUM CHLORIDE 125 ML/HR: .6; .31; .03; .02 INJECTION, SOLUTION INTRAVENOUS at 17:45

## 2022-02-02 RX ADMIN — SODIUM CHLORIDE, SODIUM LACTATE, POTASSIUM CHLORIDE, AND CALCIUM CHLORIDE 125 ML/HR: .6; .31; .03; .02 INJECTION, SOLUTION INTRAVENOUS at 22:38

## 2022-02-02 RX ADMIN — BUTORPHANOL TARTRATE 1 MG: 1 INJECTION, SOLUTION INTRAMUSCULAR; INTRAVENOUS at 20:14

## 2022-02-02 RX ADMIN — Medication 25 MCG: at 18:47

## 2022-02-02 RX ADMIN — PROMETHAZINE HYDROCHLORIDE 25 MG: 25 INJECTION INTRAMUSCULAR; INTRAVENOUS at 20:18

## 2022-02-02 NOTE — H&P
Willa 32 y o  female MRN: [id number]  Unit/Bed#: -01 Encounter: [id number]      Assessment: 32 y o   at 40w5d admitted for induction of labor  1) Pregnancy at 40w5d  - SVE: 1-260/-2  - FHT: 130s  - Clinical EFW:  growth scan EFW 75%tile   - Vertex confirmed by transabdominal ultrasound    2) Induction of Labor for late term   - Admit   - CBC, RPR, Type & Screen   - Will plan to begin induction with Cytotec and pierre balloon       3) GBS negative  - Antibiotics not indicated      4) Postpartum contraception  - undecided    5) Pain management  - Analgesia at maternal Request     6) Asthma   - currently not on medication     Dr Robert Buenrostro aware        SUBJECTIVE:    Chief Complaint: I am here for my induction     HPI: Dionne Arredondo is a 32 y o   with an DEBBI of 2022, by Ultrasound at 40w5d who is being admitted for induction of labor  She complains of uterine contractions, occurring irregularly, has no LOF, and reports no VB  She states she has felt good FM  [facility] Pregnancy complications: None    Patient Active Problem List   Diagnosis    40 weeks gestation of pregnancy    Obesity affecting pregnancy    39 weeks gestation of pregnancy       Baby complications/comments: growth scan  EFW 75%tile, AC 95%tile, BPD 55%tile, HC 51%itle, Femur 21%tile     Review of Systems   Constitutional: Negative  HENT: Negative  Eyes: Negative  Respiratory: Negative  Cardiovascular: Negative  Gastrointestinal:        Occasional contractions   Endocrine: Negative  Genitourinary: Negative  Musculoskeletal: Negative  Skin: Negative  Allergic/Immunologic: Negative  Neurological: Negative  Hematological: Negative  Psychiatric/Behavioral: Negative          OB History    Para Term  AB Living   2       1 0   SAB IAB Ectopic Multiple Live Births   0   0   0      # Outcome Date GA Lbr Luis Alberto/2nd Weight Sex Delivery Anes PTL Lv   2 Current            1 AB 2016 6w0d              Past Medical History:   Diagnosis Date    Abnormal Pap smear of cervix     IGSIL    Asthma     has  inhaler    COVID-19     Before x-mas 2021    Migraine     Varicella     had shingels        Past Surgical History:   Procedure Laterality Date    WISDOM TOOTH EXTRACTION         Social History     Tobacco Use    Smoking status: Former Smoker     Packs/day: 0 25     Types: Cigarettes     Start date: 6/1/2016    Smokeless tobacco: Never Used    Tobacco comment: Quit 3 years ago    Substance Use Topics    Alcohol use: Not Currently     Comment: stopped since finding out she is pregnant        Allergies   Allergen Reactions    Shellfish-Derived Products - Food Allergy Hives       Medications Prior to Admission   Medication    Prenatal Vit-Fe Fumarate-FA (PRENATAL 1+1 PO)    acetaminophen (TYLENOL) 325 mg tablet    albuterol (PROVENTIL HFA,VENTOLIN HFA) 90 mcg/act inhaler           OBJECTIVE:  Vitals:  Temp:  [98 5 °F (36 9 °C)-98 7 °F (37 1 °C)] 98 7 °F (37 1 °C)  HR:  [90-91] 90  Resp:  [18] 18  BP: (122)/(71) 122/71  Body mass index is 39 15 kg/m²  Physical Exam:  Physical Exam  Constitutional:       Appearance: Normal appearance  Cardiovascular:      Rate and Rhythm: Normal rate  Pulmonary:      Effort: Pulmonary effort is normal    Abdominal:      Palpations: Abdomen is soft  Comments: Gravid   Neurological:      General: No focal deficit present  Mental Status: She is alert  Skin:     General: Skin is warm and dry     Psychiatric:         Mood and Affect: Mood normal           SVE:  1-2/60/-2    FHT:  Baseline Rate: 125 bpm  Variability: Moderate 6-25 bpm  Accelerations: 15 x 15 or greater,At variable times  Decelerations: None  FHR Category: Category I    TOCO:   Contraction Frequency (minutes): irregular  Contraction Duration (seconds): 100  Contraction Quality: Mild    Lab Results   Component Value Date    WBC 11 65 (H) 02/02/2022    HGB 13 4 02/02/2022    HCT 39 9 02/02/2022     02/02/2022     No results found for: NA, K, CL, CO2, BUN, CREATININE, GLUCOSE, AST, ALT    Prenatal Labs: Reviewed      Blood type: B+  Antibody: negative  Group B strep: negative  HIV: pending  Hepatitis B: negative  RPR: non-reactive  Rubella: Immune  Varicella: Immune  1 hour Glucose: 105  Platelets: 515  Hgb: 13 6     >2 Midnights  INPATIENT       Jeanne Lea MD  OB/GYN PGY-1  2/2/2022  6:55 PM

## 2022-02-02 NOTE — ANESTHESIA PREPROCEDURE EVALUATION
Procedure:  LABOR ANALGESIA    Relevant Problems   GYN   (+) 39 weeks gestation of pregnancy   (+) 40 weeks gestation of pregnancy        Physical Exam    Airway    Mallampati score: II  TM Distance: >3 FB  Neck ROM: full     Dental   No notable dental hx     Cardiovascular      Pulmonary      Other Findings        Anesthesia Plan  ASA Score- 2     Anesthesia Type- epidural with ASA Monitors  Additional Monitors:   Airway Plan:     Comment: Patient examined, history reviewed  Labor epidural explained, risks and benefits discussed  Consent has been signed          Plan Factors-    Chart reviewed  Existing labs reviewed  Patient summary reviewed  Induction-     Postoperative Plan-     Informed Consent- Anesthetic plan and risks discussed with patient  I personally reviewed this patient with the CRNA  Discussed and agreed on the Anesthesia Plan with the CRNA  Abel Holguin

## 2022-02-02 NOTE — OB LABOR/OXYTOCIN SAFETY PROGRESS
Labor Progress Note - Renuka Pandey 32 y o  female MRN: [id number]    Unit/Bed#: -01 Encounter: [id number]       Contraction Frequency (minutes): irregular  Contraction Quality: Mild  Tachysystole: No   Cervical Dilation: 1-2        Cervical Effacement: 60  Fetal Station: -2  Baseline Rate: 125 bpm  Fetal Heart Rate: 122 BPM  FHR Category: Category I               Vital Signs:   Vitals:    02/02/22 1700   BP: 122/71   Pulse: 90   Resp: 18   Temp: 98 7 °F (37 1 °C)   SpO2: 97%           Notes/comments:    Jessica balloon and cytotec placed at this time  Initial SVE 1-2/60/-2  PROCEDURE:  JESSICA BALLOON PLACEMENT    A 24F jessica with a 30cc balloon was selected, SVE was performed and cervix was located, jessica was introduced over sterile gloved hands  Balloon advanced through cervix beyond the internal cervical os  A small amount amount of sterile saline solution was instilled in the balloon to confirm placement  Placement was confirmed to be beyond the internal cervical os  A total of 60cc of sterile saline solution was placed into the balloon  Pt tolerated well  Instructions left with RN to place jessica to gravity with a 1L bag of IV fluid  Notify MD when jessica dislodged      MD Joshua Quintana MD 2/2/2022 6:53 PM

## 2022-02-02 NOTE — PLAN OF CARE
Problem: PAIN - ADULT  Goal: Verbalizes/displays adequate comfort level or baseline comfort level  Description: Interventions:  - Encourage patient to monitor pain and request assistance  - Assess pain using appropriate pain scale  - Administer analgesics based on type and severity of pain and evaluate response  - Implement non-pharmacological measures as appropriate and evaluate response  - Consider cultural and social influences on pain and pain management  - Notify physician/advanced practitioner if interventions unsuccessful or patient reports new pain  Outcome: Progressing     Problem: INFECTION - ADULT  Goal: Absence or prevention of progression during hospitalization  Description: INTERVENTIONS:  - Assess and monitor for signs and symptoms of infection  - Monitor lab/diagnostic results  - Monitor all insertion sites, i e  indwelling lines, tubes, and drains  - Monitor endotracheal if appropriate and nasal secretions for changes in amount and color  - [place] appropriate cooling/warming therapies per order  - Administer medications as ordered  - Instruct and encourage patient and family to use good hand hygiene technique  - Identify and instruct in appropriate isolation precautions for identified infection/condition  Outcome: Progressing  Goal: Absence of fever/infection during neutropenic period  Description: INTERVENTIONS:  - Monitor WBC    Outcome: Progressing     Problem: SAFETY ADULT  Goal: Patient will remain free of falls  Description: INTERVENTIONS:  - Educate patient/family on patient safety including physical limitations  - Instruct patient to call for assistance with activity   - Consult OT/PT to assist with strengthening/mobility   - Keep Call bell within reach  - Keep bed low and locked with side rails adjusted as appropriate  - Keep care items and personal belongings within reach  - Initiate and maintain comfort rounds  - Make Fall Risk Sign visible to staff  - Offer Toileting every  Hours, in advance of need  - Initiate/Maintain alarm  - Obtain necessary fall risk management equipment:   - Apply yellow socks and bracelet for high fall risk patients  - Consider moving patient to room near nurses station  Outcome: Progressing  Goal: Maintain or return to baseline ADL function  Description: INTERVENTIONS:  -  Assess patient's ability to carry out ADLs; assess patient's baseline for ADL function and identify physical deficits which impact ability to perform ADLs (bathing, care of mouth/teeth, toileting, grooming, dressing, etc )  - Assess/evaluate cause of self-care deficits   - Assess range of motion  - Assess patient's mobility; develop plan if impaired  - Assess patient's need for assistive devices and provide as appropriate  - Encourage maximum independence but intervene and supervise when necessary  - Involve family in performance of ADLs  - Assess for home care needs following discharge   - Consider OT consult to assist with ADL evaluation and planning for discharge  - Provide patient education as appropriate  Outcome: Progressing  Goal: Maintains/Returns to pre admission functional level  Description: INTERVENTIONS:  - Perform BMAT or MOVE assessment daily    - Set and communicate daily mobility goal to care team and patient/family/caregiver  - Collaborate with rehabilitation services on mobility goals if consulted  - Perform Range of Motion  times a day  - Reposition patient every  hours    - Dangle patient  times a day  - Stand patient  times a day  - Ambulate patient  times a day  - Out of bed to chair  times a day   - Out of bed for meals times a day  - Out of bed for toileting  - Record patient progress and toleration of activity level   Outcome: Progressing     Problem: Knowledge Deficit  Goal: Patient/family/caregiver demonstrates understanding of disease process, treatment plan, medications, and discharge instructions  Description: Complete learning assessment and assess knowledge base   Interventions:  - Provide teaching at level of understanding  - Provide teaching via preferred learning methods  Outcome: Progressing     Problem: DISCHARGE PLANNING  Goal: Discharge to home or other facility with appropriate resources  Description: INTERVENTIONS:  - Identify barriers to discharge w/patient and caregiver  - Arrange for needed discharge resources and transportation as appropriate  - Identify discharge learning needs (meds, wound care, etc )  - Arrange for interpretive services to assist at discharge as needed  - Refer to Case Management Department for coordinating discharge planning if the patient needs post-hospital services based on physician/advanced practitioner order or complex needs related to functional status, cognitive ability, or social support system  Outcome: Progressing     Problem: BIRTH - VAGINAL/ SECTION  Goal: Fetal and maternal status remain reassuring during the birth process  Description: INTERVENTIONS:  - Monitor vital signs  - Monitor fetal heart rate  - Monitor uterine activity  - Monitor labor progression (vaginal delivery)  - DVT prophylaxis  - Antibiotic prophylaxis  Outcome: Progressing  Goal: Emotionally satisfying birthing experience for mother/fetus  Description: Interventions:  - Assess, plan, implement and evaluate the nursing care given to the patient in labor  - Advocate the philosophy that each childbirth experience is a unique experience and support the family's chosen level of involvement and control during the labor process   - Actively participate in both the patient's and family's teaching of the birth process  - Consider cultural, Mu-ism and age-specific factors and plan care for the patient in labor  Outcome: Progressing

## 2022-02-03 PROBLEM — O13.9 GESTATIONAL HYPERTENSION: Status: ACTIVE | Noted: 2022-02-03

## 2022-02-03 LAB
BASE EXCESS BLDCOA CALC-SCNC: -7.7 MMOL/L (ref 3–11)
BASE EXCESS BLDCOV CALC-SCNC: -7.6 MMOL/L (ref 1–9)
HCO3 BLDCOA-SCNC: 19.1 MMOL/L (ref 17.3–27.3)
HCO3 BLDCOV-SCNC: 17.7 MMOL/L (ref 12.2–28.6)
O2 CT VFR BLDCOA CALC: 10.9 ML/DL
OXYHGB MFR BLDCOA: 46.8 %
OXYHGB MFR BLDCOV: 67.6 %
PCO2 BLDCOA: 43.4 MM[HG] (ref 30–60)
PCO2 BLDCOV: 35.7 MM HG (ref 27–43)
PH BLDCOA: 7.26 [PH] (ref 7.23–7.43)
PH BLDCOV: 7.31 [PH] (ref 7.19–7.49)
PO2 BLDCOA: 23.4 MM HG (ref 5–25)
PO2 BLDCOV: 32.2 MM HG (ref 15–45)
RPR SER QL: NORMAL
SAO2 % BLDCOV: 15.9 ML/DL

## 2022-02-03 PROCEDURE — 82805 BLOOD GASES W/O2 SATURATION: CPT | Performed by: OBSTETRICS & GYNECOLOGY

## 2022-02-03 PROCEDURE — 88307 TISSUE EXAM BY PATHOLOGIST: CPT | Performed by: PATHOLOGY

## 2022-02-03 PROCEDURE — 59400 OBSTETRICAL CARE: CPT | Performed by: OBSTETRICS & GYNECOLOGY

## 2022-02-03 PROCEDURE — 0HQ9XZZ REPAIR PERINEUM SKIN, EXTERNAL APPROACH: ICD-10-PCS | Performed by: OBSTETRICS & GYNECOLOGY

## 2022-02-03 PROCEDURE — 99024 POSTOP FOLLOW-UP VISIT: CPT | Performed by: STUDENT IN AN ORGANIZED HEALTH CARE EDUCATION/TRAINING PROGRAM

## 2022-02-03 RX ORDER — CARBOPROST TROMETHAMINE 250 UG/ML
INJECTION, SOLUTION INTRAMUSCULAR
Status: DISPENSED
Start: 2022-02-03 | End: 2022-02-04

## 2022-02-03 RX ORDER — HYDROMORPHONE HCL/PF 1 MG/ML
0.5 SYRINGE (ML) INJECTION ONCE
Status: COMPLETED | OUTPATIENT
Start: 2022-02-03 | End: 2022-02-03

## 2022-02-03 RX ORDER — METHYLERGONOVINE MALEATE 0.2 MG/ML
0.2 INJECTION INTRAVENOUS ONCE
Status: COMPLETED | OUTPATIENT
Start: 2022-02-03 | End: 2022-02-03

## 2022-02-03 RX ORDER — ACETAMINOPHEN 325 MG/1
650 TABLET ORAL EVERY 4 HOURS PRN
Status: DISCONTINUED | OUTPATIENT
Start: 2022-02-03 | End: 2022-02-04 | Stop reason: HOSPADM

## 2022-02-03 RX ORDER — METHYLERGONOVINE MALEATE 0.2 MG/ML
INJECTION INTRAVENOUS
Status: COMPLETED
Start: 2022-02-03 | End: 2022-02-03

## 2022-02-03 RX ORDER — ONDANSETRON 2 MG/ML
4 INJECTION INTRAMUSCULAR; INTRAVENOUS EVERY 8 HOURS PRN
Status: DISCONTINUED | OUTPATIENT
Start: 2022-02-03 | End: 2022-02-04 | Stop reason: HOSPADM

## 2022-02-03 RX ORDER — DIAPER,BRIEF,INFANT-TODD,DISP
1 EACH MISCELLANEOUS DAILY PRN
Status: DISCONTINUED | OUTPATIENT
Start: 2022-02-03 | End: 2022-02-04 | Stop reason: HOSPADM

## 2022-02-03 RX ORDER — DOCUSATE SODIUM 100 MG/1
100 CAPSULE, LIQUID FILLED ORAL 2 TIMES DAILY
Status: DISCONTINUED | OUTPATIENT
Start: 2022-02-03 | End: 2022-02-04 | Stop reason: HOSPADM

## 2022-02-03 RX ORDER — DIPHENHYDRAMINE HCL 25 MG
25 TABLET ORAL EVERY 6 HOURS PRN
Status: DISCONTINUED | OUTPATIENT
Start: 2022-02-03 | End: 2022-02-04 | Stop reason: HOSPADM

## 2022-02-03 RX ORDER — SIMETHICONE 80 MG
80 TABLET,CHEWABLE ORAL 4 TIMES DAILY PRN
Status: DISCONTINUED | OUTPATIENT
Start: 2022-02-03 | End: 2022-02-04 | Stop reason: HOSPADM

## 2022-02-03 RX ORDER — ROPIVACAINE HYDROCHLORIDE 2 MG/ML
INJECTION, SOLUTION EPIDURAL; INFILTRATION; PERINEURAL AS NEEDED
Status: DISCONTINUED | OUTPATIENT
Start: 2022-02-03 | End: 2022-02-03 | Stop reason: HOSPADM

## 2022-02-03 RX ORDER — IBUPROFEN 600 MG/1
600 TABLET ORAL EVERY 6 HOURS
Status: DISCONTINUED | OUTPATIENT
Start: 2022-02-03 | End: 2022-02-04 | Stop reason: HOSPADM

## 2022-02-03 RX ORDER — CALCIUM CARBONATE 200(500)MG
1000 TABLET,CHEWABLE ORAL DAILY PRN
Status: DISCONTINUED | OUTPATIENT
Start: 2022-02-03 | End: 2022-02-04 | Stop reason: HOSPADM

## 2022-02-03 RX ORDER — LIDOCAINE HYDROCHLORIDE AND EPINEPHRINE 15; 5 MG/ML; UG/ML
INJECTION, SOLUTION EPIDURAL
Status: COMPLETED | OUTPATIENT
Start: 2022-02-03 | End: 2022-02-03

## 2022-02-03 RX ORDER — TERBUTALINE SULFATE 1 MG/ML
INJECTION, SOLUTION SUBCUTANEOUS
Status: DISPENSED
Start: 2022-02-03 | End: 2022-02-03

## 2022-02-03 RX ORDER — KETOROLAC TROMETHAMINE 30 MG/ML
30 INJECTION, SOLUTION INTRAMUSCULAR; INTRAVENOUS ONCE
Status: COMPLETED | OUTPATIENT
Start: 2022-02-03 | End: 2022-02-03

## 2022-02-03 RX ADMIN — ROPIVACAINE HYDROCHLORIDE: 2 INJECTION, SOLUTION EPIDURAL; INFILTRATION at 07:18

## 2022-02-03 RX ADMIN — ONDANSETRON 4 MG: 2 INJECTION INTRAMUSCULAR; INTRAVENOUS at 12:47

## 2022-02-03 RX ADMIN — HYDROCORTISONE 1 APPLICATION: 1 CREAM TOPICAL at 20:45

## 2022-02-03 RX ADMIN — LIDOCAINE HYDROCHLORIDE AND EPINEPHRINE 3 ML: 15; 5 INJECTION, SOLUTION EPIDURAL at 00:28

## 2022-02-03 RX ADMIN — Medication 2 MILLI-UNITS/MIN: at 05:01

## 2022-02-03 RX ADMIN — ROPIVACAINE HYDROCHLORIDE: 2 INJECTION, SOLUTION EPIDURAL; INFILTRATION at 00:39

## 2022-02-03 RX ADMIN — Medication 62.5 MILLI-UNITS/MIN: at 18:35

## 2022-02-03 RX ADMIN — SODIUM CHLORIDE, SODIUM LACTATE, POTASSIUM CHLORIDE, AND CALCIUM CHLORIDE 125 ML/HR: .6; .31; .03; .02 INJECTION, SOLUTION INTRAVENOUS at 05:02

## 2022-02-03 RX ADMIN — METHYLERGONOVINE MALEATE 0.2 MG: 0.2 INJECTION INTRAVENOUS at 18:19

## 2022-02-03 RX ADMIN — ROPIVACAINE HYDROCHLORIDE: 2 INJECTION, SOLUTION EPIDURAL; INFILTRATION at 12:32

## 2022-02-03 RX ADMIN — HYDROMORPHONE HYDROCHLORIDE 0.5 MG: 1 INJECTION, SOLUTION INTRAMUSCULAR; INTRAVENOUS; SUBCUTANEOUS at 19:06

## 2022-02-03 RX ADMIN — ROPIVACAINE HYDROCHLORIDE 5 ML: 2 INJECTION, SOLUTION EPIDURAL; INFILTRATION at 09:52

## 2022-02-03 RX ADMIN — BENZOCAINE AND LEVOMENTHOL 1 APPLICATION: 200; 5 SPRAY TOPICAL at 20:45

## 2022-02-03 RX ADMIN — KETOROLAC TROMETHAMINE 30 MG: 30 INJECTION, SOLUTION INTRAMUSCULAR at 18:06

## 2022-02-03 NOTE — OB LABOR/OXYTOCIN SAFETY PROGRESS
Labor Progress Note - Otilia Poolto 32 y o  female MRN: [id number]    Unit/Bed#:  205-01 Encounter: [id number]       Contraction Frequency (minutes): 1-3  Contraction Quality: Strong  Tachysystole: No   Cervical Dilation: 5-6        Cervical Effacement: 70  Fetal Station: -2  Baseline Rate: 135 bpm  Fetal Heart Rate: 138 BPM  FHR Category: Category II               Vital Signs:   Vitals:    02/03/22 0103   BP: 124/77   Pulse: 61   Resp:    Temp:    SpO2:            Notes/comments:   Due for check  SVE at this time 5-6/70/-2 with bulging bag  Patient comfortable with epidural  FHT with occasional variable decels   Mayra Q1-3 on Em Gleason MD 2/3/2022 1:18 AM

## 2022-02-03 NOTE — OB LABOR/OXYTOCIN SAFETY PROGRESS
Labor Progress Note - Jane Mcleod 32 y o  female MRN: [id number]    Unit/Bed#: -01 Encounter: [id number]       Contraction Frequency (minutes): 2-3 5  Contraction Quality: Moderate  Tachysystole: No   Cervical Dilation: 5        Cervical Effacement: 70  Fetal Station: -2  Baseline Rate: 125 bpm  Fetal Heart Rate: 138 BPM  FHR Category: Category I               Vital Signs:   Vitals:    02/02/22 2240   BP: 142/81   Pulse: 87   Resp:    Temp:    SpO2:            Notes/comments:    Swift balloon out, SVE at this time 5/70/-2  FHT category I  Will plan to begin pitocin titration       Alton Vargas MD 2/2/2022 10:40 PM

## 2022-02-03 NOTE — OB LABOR/OXYTOCIN SAFETY PROGRESS
Labor Progress Note - Shane Chapman 32 y o  female MRN: [id number]    Unit/Bed#: -01 Encounter: [id number]       Contraction Frequency (minutes): 1-3  Contraction Quality: Strong  Tachysystole: No   Cervical Dilation: 5-6        Cervical Effacement: 70  Fetal Station: -2  Baseline Rate: 140 bpm  Fetal Heart Rate: 143 BPM  FHR Category: Category II  Oxytocin Safety Progress Check: Safety check completed            Vital Signs:   Vitals:    02/03/22 0315   BP: 122/70   Pulse: 94   Resp: 18   Temp: 99 1 °F (37 3 °C)   SpO2:            Notes/comments:   Patient comfortable with epidural    At 0318, FHT notable for prolonged deceleration, lasting 6 min, with leonardo to the 60s  Patient was repositioned to left and right sides  Given IV fluid bolus and O2 supplementation  SVE performed, unchanged with intact membranes  Patient the repositioned to hands and knees  Deceleration likely secondary to sustained, strong contraction that was appreciated at this time by palpation of abdomen and tocometry  Terbutaline was brought into the room, however was NOT given as deceleration was noted to resolve with above interventions  FHT noted to return to baseline, Category I  Will continue expectant management for now  If tracing remains Cat I, can attempt AROM at next exam    SONYA Keen MD 2/3/2022 3:52 AM

## 2022-02-03 NOTE — OB LABOR/OXYTOCIN SAFETY PROGRESS
Oxytocin Safety Progress Check Note - Vadim Allen 32 y o  female MRN: [id number]    Unit/Bed#: -01 Encounter: [id number]    Dose (hpam-units/min) Oxytocin: 8 pham-units/min  Contraction Frequency (minutes): 1 5-3  Contraction Quality: Moderate,Strong  Tachysystole: No   Cervical Dilation: 5-6        Cervical Effacement: 70  Fetal Station: -2  Baseline Rate: 130 bpm  Fetal Heart Rate: 112 BPM  FHR Category: Category I  Oxytocin Safety Progress Check: Safety check completed    Vital Signs:   Vitals:    02/03/22 0700   BP: 130/83   Pulse: 88   Resp:    Temp:    SpO2:      Notes/comments:     She is comfortable and was sleeping  Cervix is unchanged  Fetal head felt asynclitic, did not rupture at this time  Recommended peanut ball and changing positions  FHT is category 1  Plan to AROM at next check  D/W Dr Lea Alba    Update: Present at bedside for a spontaneous late deceleration to the 90s which resolved on its own  Patient is lying on her right side with peanut ball in place  Pit at 15  Will continue to monitor       Iglesia Durant, [place] 2/3/2022 8:03 AM

## 2022-02-03 NOTE — PLAN OF CARE
Problem: PAIN - ADULT  Goal: Verbalizes/displays adequate comfort level or baseline comfort level  Description: Interventions:  - Encourage patient to monitor pain and request assistance  - Assess pain using appropriate pain scale  - Administer analgesics based on type and severity of pain and evaluate response  - Implement non-pharmacological measures as appropriate and evaluate response  - Consider cultural and social influences on pain and pain management  - Notify physician/advanced practitioner if interventions unsuccessful or patient reports new pain  Outcome: Progressing     Problem: INFECTION - ADULT  Goal: Absence or prevention of progression during hospitalization  Description: INTERVENTIONS:  - Assess and monitor for signs and symptoms of infection  - Monitor lab/diagnostic results  - Monitor all insertion sites, i e  indwelling lines, tubes, and drains  - Monitor endotracheal if appropriate and nasal secretions for changes in amount and color  - [place] appropriate cooling/warming therapies per order  - Administer medications as ordered  - Instruct and encourage patient and family to use good hand hygiene technique  - Identify and instruct in appropriate isolation precautions for identified infection/condition  Outcome: Progressing  Goal: Absence of fever/infection during neutropenic period  Description: INTERVENTIONS:  - Monitor WBC    Outcome: Progressing     Problem: SAFETY ADULT  Goal: Patient will remain free of falls  Description: INTERVENTIONS:  - Educate patient/family on patient safety including physical limitations  - Instruct patient to call for assistance with activity   - Consult OT/PT to assist with strengthening/mobility   - Keep Call bell within reach  - Keep bed low and locked with side rails adjusted as appropriate  - Keep care items and personal belongings within reach  - Initiate and maintain comfort rounds  - Make Fall Risk Sign visible to staff  - Apply yellow socks and bracelet for high fall risk patients  - Consider moving patient to room near nurses station  Outcome: Progressing  Goal: Maintain or return to baseline ADL function  Description: INTERVENTIONS:  -  Assess patient's ability to carry out ADLs; assess patient's baseline for ADL function and identify physical deficits which impact ability to perform ADLs (bathing, care of mouth/teeth, toileting, grooming, dressing, etc )  - Assess/evaluate cause of self-care deficits   - Assess range of motion  - Assess patient's mobility; develop plan if impaired  - Assess patient's need for assistive devices and provide as appropriate  - Encourage maximum independence but intervene and supervise when necessary  - Involve family in performance of ADLs  - Assess for home care needs following discharge   - Consider OT consult to assist with ADL evaluation and planning for discharge  - Provide patient education as appropriate  Outcome: Progressing  Goal: Maintains/Returns to pre admission functional level  Description: INTERVENTIONS:  - Perform BMAT or MOVE assessment daily    - Set and communicate daily mobility goal to care team and patient/family/caregiver  - Collaborate with rehabilitation services on mobility goals if consulted  - Out of bed for toileting  - Record patient progress and toleration of activity level   Outcome: Progressing     Problem: Knowledge Deficit  Goal: Patient/family/caregiver demonstrates understanding of disease process, treatment plan, medications, and discharge instructions  Description: Complete learning assessment and assess knowledge base    Interventions:  - Provide teaching at level of understanding  - Provide teaching via preferred learning methods  Outcome: Progressing     Problem: DISCHARGE PLANNING  Goal: Discharge to home or other facility with appropriate resources  Description: INTERVENTIONS:  - Identify barriers to discharge w/patient and caregiver  - Arrange for needed discharge resources and transportation as appropriate  - Identify discharge learning needs (meds, wound care, etc )  - Arrange for interpretive services to assist at discharge as needed  - Refer to Case Management Department for coordinating discharge planning if the patient needs post-hospital services based on physician/advanced practitioner order or complex needs related to functional status, cognitive ability, or social support system  Outcome: Progressing     Problem: BIRTH - VAGINAL/ SECTION  Goal: Fetal and maternal status remain reassuring during the birth process  Description: INTERVENTIONS:  - Monitor vital signs  - Monitor fetal heart rate  - Monitor uterine activity  - Monitor labor progression (vaginal delivery)  - DVT prophylaxis  - Antibiotic prophylaxis  Outcome: Progressing  Goal: Emotionally satisfying birthing experience for mother/fetus  Description: Interventions:  - Assess, plan, implement and evaluate the nursing care given to the patient in labor  - Advocate the philosophy that each childbirth experience is a unique experience and support the family's chosen level of involvement and control during the labor process   - Actively participate in both the patient's and family's teaching of the birth process  - Consider cultural, Faith and age-specific factors and plan care for the patient in labor  Outcome: Progressing

## 2022-02-03 NOTE — OB LABOR/OXYTOCIN SAFETY PROGRESS
Oxytocin Safety Progress Check Note - Nichol Salvador 32 y o  female MRN: [id number]    Unit/Bed#: -01 Encounter: [id number]    Dose (pham-units/min) Oxytocin: 12 pham-units/min  Contraction Frequency (minutes): 1 5-2 5  Contraction Quality: Moderate,Strong  Tachysystole: No   Cervical Dilation: 7        Cervical Effacement: 80  Fetal Station: -1  Baseline Rate: 140 bpm  Fetal Heart Rate: 138 BPM  FHR Category: Category I  Oxytocin Safety Progress Check: Safety check completed            Vital Signs:   Vitals:    02/03/22 1015   BP: 137/85   Pulse: 79   Resp:    Temp:    SpO2:            Notes/comments:    Patient seen evaluated patient seen evaluated complaining of lower back pain already took epidural last night pain is worse with contraction anesthesia called for able list   Patient was having contraction Q 3-5 minute   Fetal heart rate reassuring   Pelvic exam performed cervix 7/80/-1 artificial rupture of membrane performed thin meconium noted nicu we will be notified at the delivery   Finding discussed with patient  Plan anesthesia evaluation   Continue Pitocin   Continue current management   Expect vaginal delivery    Estefania Christie MD 2/3/2022 10:44 AM

## 2022-02-03 NOTE — OB LABOR/OXYTOCIN SAFETY PROGRESS
Oxytocin Safety Progress Check Note - Renuka Pandey 32 y o  female MRN: [id number]    Unit/Bed#: -01 Encounter: [id number]    Dose (pham-units/min) Oxytocin: 12 pham-units/min  Contraction Frequency (minutes): 3  Contraction Quality: Moderate,Mild  Tachysystole: No   Cervical Dilation: 7        Cervical Effacement: 90  Fetal Station: 0  Baseline Rate: 160 bpm  Fetal Heart Rate: 148 BPM  FHR Category: Category II  Oxytocin Safety Progress Check: Safety check completed            Vital Signs:   Vitals:    02/03/22 1130   BP: 133/85   Pulse: 90   Resp:    Temp:    SpO2:            Notes/comments:    Exam shown as above patient progressed since last check  Patient is feeling pain anesthesia will be notified  I encourage use peanut ball and position to sites  Fetal heart tone 60 category 2  Will continue monitoring and cervical check as needed    Discussed with Thomasina Opitz, MD 6/3/1956 51:19 PM

## 2022-02-03 NOTE — ANESTHESIA PROCEDURE NOTES
Epidural Block    Patient location during procedure: OB  Start time: 2/3/2022 12:18 AM  Reason for block: primary anesthetic  Staffing  Performed: CRNA   Anesthesiologist: Luis Trujillo MD  Resident/CRNA: Sandra Wall CRNA  Preanesthetic Checklist  Completed: patient identified, IV checked, site marked, risks and benefits discussed, surgical consent, monitors and equipment checked, pre-op evaluation and timeout performed  Epidural  Patient position: sitting  Prep: ChloraPrep  Patient monitoring: heart rate, cardiac monitor and frequent blood pressure checks  Approach: midline  Location: lumbar  Injection technique: HIGINIO saline  Needle  Needle type: Tuohy   Needle gauge: 17 G  Catheter type: side hole  Catheter size: 19 G  Catheter at skin depth: 13 cm  Catheter securement method: clear occlusive dressinglidocaine 1 5% with epinephrine 1:200,000 test dose, 3 mL  Assessment  Number of attempts: 2no paresthesia on injection, negative aspiration for heme and negative aspiration for CSF  patient tolerated the procedure well with no immediate complications  Additional Notes  Attempt x2 catheter threaded without diff [FreeTextEntry2] : Omkar is a 14 year 3 month old male, here with his father, for follow up of familial classic isolated GH deficiency.  He was last seen by Dr. Jay in 8/2020.  Review of his history shows that he underwent GH stimulation testing in 5/2016 with Arginine/Clonidine, with peak GH of 0.36 ng/mL, and MRI showed small pituitary.\par Since his last visit, Omkar, has been well.  He denies interval illness.   He administers GH to stomach himself.  He denies headaches, vision changes, hip/knee pain, polyuria, or polydipsia.

## 2022-02-03 NOTE — OB LABOR/OXYTOCIN SAFETY PROGRESS
Labor Progress Note - Colt Later 32 y o  female MRN: [id number]    Unit/Bed#: -01 Encounter: [id number]       Contraction Frequency (minutes): 1-4  Contraction Quality: Strong,Moderate  Tachysystole: No   Cervical Dilation: 5        Cervical Effacement: 70  Fetal Station: -2  Baseline Rate: 135 bpm  Fetal Heart Rate: 135 BPM  FHR Category: Category I  Oxytocin Safety Progress Check: Safety check completed            Vital Signs:   Vitals:    02/03/22 0433   BP: 104/59   Pulse: 81   Resp:    Temp:    SpO2:            Notes/comments:   FHT continues to remain Category I since 6 min decel at 0318  Will start pitocin for labor augmentation       Gerald Canela MD 2/3/2022 4:57 AM

## 2022-02-03 NOTE — OB LABOR/OXYTOCIN SAFETY PROGRESS
Oxytocin Safety Progress Check Note - Clearance Bacca 32 y o  female MRN: [id number]    Unit/Bed#: -01 Encounter: [id number]    Dose (pham-units/min) Oxytocin: 14 pham-units/min  Contraction Frequency (minutes): 2-3  Contraction Quality: Strong  Tachysystole: No   Cervical Dilation: 8        Cervical Effacement: 90  Fetal Station: 0  Baseline Rate: 150 bpm  Fetal Heart Rate: 152 BPM  FHR Category: Category I  Oxytocin Safety Progress Check: Safety check completed            Vital Signs:   Vitals:    02/03/22 1417   BP: 127/82   Pulse: 98   Resp:    Temp:    SpO2:      Notes/comments:      Cervical exam is 8/90/0, with swelling noted at the anterior lip  FHT is category I with reduction of pitocin, due to tachysystole  Pit now at 8  Patient was bladder scanned due to pierre not emptying  Bladder is compressed, but pierre was noted to be in the correct position  Encouraged PO hydration  D/W Dr Oracio LynchLeigh, [place] 2/3/2022 2:46 PM

## 2022-02-04 VITALS
BODY MASS INDEX: 39.16 KG/M2 | HEIGHT: 63 IN | OXYGEN SATURATION: 98 % | SYSTOLIC BLOOD PRESSURE: 121 MMHG | TEMPERATURE: 97.7 F | RESPIRATION RATE: 18 BRPM | DIASTOLIC BLOOD PRESSURE: 72 MMHG | HEART RATE: 98 BPM | WEIGHT: 221 LBS

## 2022-02-04 PROBLEM — O99.210 OBESITY AFFECTING PREGNANCY: Status: RESOLVED | Noted: 2021-12-21 | Resolved: 2022-02-04

## 2022-02-04 PROCEDURE — 99024 POSTOP FOLLOW-UP VISIT: CPT | Performed by: STUDENT IN AN ORGANIZED HEALTH CARE EDUCATION/TRAINING PROGRAM

## 2022-02-04 RX ORDER — SIMETHICONE 80 MG
80 TABLET,CHEWABLE ORAL 4 TIMES DAILY PRN
Qty: 30 TABLET | Refills: 0
Start: 2022-02-04

## 2022-02-04 RX ORDER — IBUPROFEN 600 MG/1
600 TABLET ORAL EVERY 6 HOURS
Qty: 30 TABLET | Refills: 0 | Status: SHIPPED | OUTPATIENT
Start: 2022-02-04

## 2022-02-04 RX ADMIN — IBUPROFEN 600 MG: 600 TABLET ORAL at 01:39

## 2022-02-04 RX ADMIN — DOCUSATE SODIUM 100 MG: 100 CAPSULE ORAL at 08:16

## 2022-02-04 RX ADMIN — IBUPROFEN 600 MG: 600 TABLET ORAL at 15:32

## 2022-02-04 RX ADMIN — DOCUSATE SODIUM 100 MG: 100 CAPSULE ORAL at 20:18

## 2022-02-04 RX ADMIN — IBUPROFEN 600 MG: 600 TABLET ORAL at 08:16

## 2022-02-04 NOTE — LACTATION NOTE
This note was copied from a baby's chart  Provided Mom with RSB, she is mainly bottle feeding but will look into breastfeeding and call for assistance if needed  She was not planning on pumping and declines assistance with obtaining a pump for home

## 2022-02-04 NOTE — ANESTHESIA POSTPROCEDURE EVALUATION
Post-Op Assessment Note    CV Status:  Stable  Pain Score: 0    Pain management: adequate     Mental Status:  Alert and awake   Hydration Status:  Euvolemic   PONV Controlled:  Controlled   Airway Patency:  Patent      Post Op Vitals Reviewed: Yes      Staff: CRNA     Post-op block assessment: catheter intact, site cleaned and no complications      No complications documented      BP      Temp      Pulse     Resp      SpO2

## 2022-02-04 NOTE — DISCHARGE SUMMARY
Discharge Summary - Daiana Cassidy 32 y o  female MRN: [id number]    Unit/Bed#: -01 Encounter: [id number]    Admission Date: 2022     Discharge Date: 22    Admitting Attending: Mei Ladd  Delivering Attending: Geovanny Mclain   Discharge Attending: Selina Draper    Predelivery Diagnosis:   1  Pregnancy at 40 weeks of gestation   2  Elevated BMI in pregnancy   3  Gestational Hypertension     Postdelivery Diagnosis:   Same as above, delivered    Procedures: Spontaneous Vaginal Delivery, Repair of first degree laceration     Complications: none apparent    Hospital Course: Ms Daiana Cassidy is a 32 y o   at [facility]  She presented to labor and delivery for induction of labor  On arrival she was found to be 1-2/60/-2  A pierre ballon was placed and she received a dose of cytotec  During her labor course she met criteria for gestational hypertension  A CBC, CMP and P:C ratio were collected to rule out preeclampsia and were noted to be within normal limits  The pierre balloon was expelled and she made change to 5/70/-2  She experienced a prolonged deceleration that resolved with maternal repositioning  She was started on a low dose pitocin titration and was AROM'd for thin meconium stained fluid  She progressed to complete and began pushing  She delivered via  with a first degree and right labial lacerations  She had an uncomplicated postpartum course  Condition at discharge: stable     On day of discharge, patient was tolerating PO, passing flatus, urinating, and ambulating  Her pain was well controlled with oral analgesics  She denies symptoms of pre-eclampsia and had no severe range BPs  She was discharged home on postpartum day #2 with standard post partum instructions to follow up with her physician in 3-6 weeks for a postpartum appointment       Discharge instructions/Information to patient and family:   - Do not place anything (no partner, tampons or douche) in your vagina for 6 weeks   -You may walk for exercise for the first 6 weeks then gradually return to your usual activities    -Please do not drive for 1 week if you have no stitches and for 2 weeks if you have stitches or underwent a  delivery     -You may take baths or shower per your preference    -Please look at your bust (breasts) in the mirror daily and call for redness or tenderness or increased warmth    -Please call us for temperature > 100 4*F or 38*C, worsening pain or a foul discharge  Discharge Medications:   Prenatal vitamin daily for 6 months or the duration of nursing whichever is longer  Motrin 600 mg orally every 6 hours as needed for pain  Tylenol (over the counter) per bottle directions as needed for pain: do NOT use with percocet  Hydrocortisone cream 1% (over the counter) applied 1-2x daily to hemorrhoids as needed    Provisions for Follow-Up Care: Follow up with your doctor in 3 weeks for postpartum care       Planned Readmission: Estefany Lyle MD  Family Medicine PGY-1

## 2022-02-04 NOTE — PLAN OF CARE
Problem: PAIN - ADULT  Goal: Verbalizes/displays adequate comfort level or baseline comfort level  Description: Interventions:  - Encourage patient to monitor pain and request assistance  - Assess pain using appropriate pain scale  - Administer analgesics based on type and severity of pain and evaluate response  - Implement non-pharmacological measures as appropriate and evaluate response  - Consider cultural and social influences on pain and pain management  - Notify physician/advanced practitioner if interventions unsuccessful or patient reports new pain  Outcome: Progressing     Problem: INFECTION - ADULT  Goal: Absence or prevention of progression during hospitalization  Description: INTERVENTIONS:  - Assess and monitor for signs and symptoms of infection  - Monitor lab/diagnostic results  - Monitor all insertion sites, i e  indwelling lines, tubes, and drains  - Monitor endotracheal if appropriate and nasal secretions for changes in amount and color  - [place] appropriate cooling/warming therapies per order  - Administer medications as ordered  - Instruct and encourage patient and family to use good hand hygiene technique  - Identify and instruct in appropriate isolation precautions for identified infection/condition  Outcome: Progressing  Goal: Absence of fever/infection during neutropenic period  Description: INTERVENTIONS:  - Monitor WBC    Outcome: Progressing     Problem: SAFETY ADULT  Goal: Patient will remain free of falls  Description: INTERVENTIONS:  - Educate patient/family on patient safety including physical limitations  - Instruct patient to call for assistance with activity   - Consult OT/PT to assist with strengthening/mobility   - Keep Call bell within reach  - Keep bed low and locked with side rails adjusted as appropriate  - Keep care items and personal belongings within reach  - Initiate and maintain comfort rounds  - Make Fall Risk Sign visible to staff    - Apply yellow socks and bracelet for high fall risk patients  - Consider moving patient to room near nurses station  Outcome: Progressing  Goal: Maintain or return to baseline ADL function  Description: INTERVENTIONS:  -  Assess patient's ability to carry out ADLs; assess patient's baseline for ADL function and identify physical deficits which impact ability to perform ADLs (bathing, care of mouth/teeth, toileting, grooming, dressing, etc )  - Assess/evaluate cause of self-care deficits   - Assess range of motion  - Assess patient's mobility; develop plan if impaired  - Assess patient's need for assistive devices and provide as appropriate  - Encourage maximum independence but intervene and supervise when necessary  - Involve family in performance of ADLs  - Assess for home care needs following discharge   - Consider OT consult to assist with ADL evaluation and planning for discharge  - Provide patient education as appropriate  Outcome: Progressing  Goal: Maintains/Returns to pre admission functional level  Description: INTERVENTIONS:  - Perform BMAT or MOVE assessment daily    - Set and communicate daily mobility goal to care team and patient/family/caregiver  - Collaborate with rehabilitation services on mobility goals if consulted    - Out of bed for toileting  - Record patient progress and toleration of activity level   Outcome: Progressing     Problem: Knowledge Deficit  Goal: Patient/family/caregiver demonstrates understanding of disease process, treatment plan, medications, and discharge instructions  Description: Complete learning assessment and assess knowledge base    Interventions:  - Provide teaching at level of understanding  - Provide teaching via preferred learning methods  Outcome: Progressing     Problem: DISCHARGE PLANNING  Goal: Discharge to home or other facility with appropriate resources  Description: INTERVENTIONS:  - Identify barriers to discharge w/patient and caregiver  - Arrange for needed discharge resources and transportation as appropriate  - Identify discharge learning needs (meds, wound care, etc )  - Arrange for interpretive services to assist at discharge as needed  - Refer to Case Management Department for coordinating discharge planning if the patient needs post-hospital services based on physician/advanced practitioner order or complex needs related to functional status, cognitive ability, or social support system  Outcome: Progressing     Problem: BIRTH - VAGINAL/ SECTION  Goal: Fetal and maternal status remain reassuring during the birth process  Description: INTERVENTIONS:  - Monitor vital signs  - Monitor fetal heart rate  - Monitor uterine activity  - Monitor labor progression (vaginal delivery)  - DVT prophylaxis  - Antibiotic prophylaxis  Outcome: Progressing  Goal: Emotionally satisfying birthing experience for mother/fetus  Description: Interventions:  - Assess, plan, implement and evaluate the nursing care given to the patient in labor  - Advocate the philosophy that each childbirth experience is a unique experience and support the family's chosen level of involvement and control during the labor process   - Actively participate in both the patient's and family's teaching of the birth process  - Consider cultural, Muslim and age-specific factors and plan care for the patient in labor  Outcome: Progressing

## 2022-02-04 NOTE — L&D DELIVERY NOTE
Vaginal Delivery Summary - OB/GYN   Renuka Pandey 32 y o  female MRN: [id number]  Unit/Bed#: -01 Encounter: [id number]          Predelivery Diagnosis:  1  Pregnancy at 40 weeks of gestation   2  Elevated BMI in pregnancy   3  Gestational Hypertension     Postdelivery Diagnosis:  1  Same as above  2  Delivery of term     Procedure: Spontaneous Vaginal Delivery, repair of first degree laceration    Attending: Aviva Macias    Assistant: Francine Dang    Anesthesia: Epidural    QBL: 662cc  Admission H 4  Admission platelets: 008    Complications: none apparent    Specimens: cord blood, arterial and venous cord blood gasses, placenta to storage    Findings:   1  Viable female at 65, with APGARS of 7 and 9 at 1 and 5 minutes respectively,  2  Spontaneous delivery of intact placenta   3  1 degree laceration repaired with 3-0 vicryl rapide   4  Blood gases:    Umbilical Cord Venous Blood Gas:  Results from last 7 days   Lab Units 22  1808   PH COV  7 312   PCO2 COV mm HG 35 7   HCO3 COV mmol/L 17 7   BASE EXC COV mmol/L -7 6*   O2 CT CD VB mL/dL 15 9   O2 HGB, VENOUS CORD % 78 6     Umbilical Cord Arterial Blood Gas:  Results from last 7 days   Lab Units 22  1808   PH COA  7 261   PCO2 COA  43 4   PO2 COA mm HG 23 4   HCO3 COA mmol/L 19 1   BASE EXC COA mmol/L -7 7*   O2 CONTENT CORD ART ml/dl 10 9   O2 HGB, ARTERIAL CORD % 46 8       Disposition:  Patient tolerated the procedure well and was recovering in labor and delivery room     Brief history and labor course:  Ms Renuka Pandey is a 32 y o  [facility]  She presented to labor and delivery for induction of labor  On arrival she was found to be 1-2/60/-2  A pierre ballon was placed and she received a dose of cytotec  During her labor course she met criteria for gestational hypertension  A CBC, CMP and P:C ratio were collected to rule out preeclampsia and were noted to be within normal limits   The pierre balloon was expelled and she made change to 5/70/-2  She experienced a prolonged deceleration that resolved with maternal repositioning  She was started on a low dose pitocin titration and was AROM'd for thin meconium stained fluid  She progressed to complete and began pushing  Description of procedure    After pushing for 86 minutes, at 1805 patient delivered a viable female , wt pending, apgars of 7 (1 min) and 9 (5 min)  The fetal vertex delivered spontaneously  The fetal head initially restituted LOT and baby was checked for nuchal  No nuchal cord was noted and an attempt was made to delivery the shoulder  The shoulder was not noted to deliver, the fetal head re-restituted  NURY and the anterior (fetal left) should delivered atraumatically with maternal expulsive forces and gentle downward traction  The posterior shoulder delivered with maternal expulsive forces and the assistance of upward traction  The remainder of the fetus delivered spontaneously  Upon delivery, the infant was placed on the mothers abdomen and the cord was clamped and cut  Delayed cord clamping was performed  The infant was noted to cry spontaneously and was moving all extremities appropriately  There was no evidence for injury  Awaiting nurse resuscitators evaluated the   Arterial and venous cord blood gases and cord blood was collected for analysis  These were promptly sent to the lab  In the immediate post-partum, 30 units of IV pitocin was administered, and the uterus was noted to contract down well with massage and pitocin  The placenta delivered spontaneously at 1811 and was noted to have a centrally inserted 3 vessel cord  The vagina, cervix, perineum, and rectum were inspected and there was noted to be a small first degree laceration  Using a 3-0 vicryl rapide, the apex of the laceration was identified and reapproximated in a running-locked fashion  A brisk amount of bleeding was noted and a bimanual exam was performed   Atony was noted in the lower uterine segment and the rate of pitocin titration was increased  Atony was noted to persist and 0 25 mg of methergine was administered in the patient's left thigh  Tone was noted to improve and bleeding slowed  Bleeding was then noted to increase  Given increase in bleeding the decision was made to re-inspect the cervix  A Nate retractor was used to visualize the cervix  Using ring forceps, the entirety of the cervix was visualized  An area of bleeding was visualzed on the posterior aspect of the lower uterine segment near the cervix  A figure of eight stitch was placed and used to aid in visualization  Continued fundal massage was performed and the uterus was noted to contract down and bleeding stopped  The vaginal was reinspected a small, mucosal laceration was noted on the interior aspect of the right labia  Pressure was held and no stitches were required  At the conclusion of the procedure, all needle, sponge, and instrument counts were noted to be correct  Patient tolerated the procedure well and was allowed to recover in labor and delivery room with family and  before being transferred to the post-partum floor  Dr Christine Vee was present and participated in all key portions of the case          Yasmeen Angel MD  2/3/2022  7:21 PM

## 2022-02-04 NOTE — PROGRESS NOTES
Progress Note - OB/GYN   Daiana Cassidy 32 y o  female MRN: [id number]  Unit/Bed#:  312-01 Encounter: [id number]    Assessment:  Post partum Day #1 s/p , stable, baby in room    Plan:  1) gHTN   Monitor V/S:  /75, HR 88   No symptoms of pre-eclampsia    2) Contraception   Patient wants to talk w/ Dr Geovanny Mclain post-partum to decide  Has never  used contraception    3) Continue routine post partum care   Encourage ambulation   Encourage breastfeeding   Anticipate discharge PPD #1/2     Subjective/Objective   Chief Complaint:     Post delivery  Patient is doing well  Lochia WNL  Pain well controlled  Subjective:     Pain: yes, cramping, improved with meds  Tolerating PO: yes  Voiding: yes  Flatus: yes  Ambulating: yes  Chest pain: no  Shortness of breath: no  Leg pain: no  Lochia: minimal    Objective:     Vitals: /75 (BP Location: Right arm)   Pulse 88   Temp 98 3 °F (36 8 °C) (Oral)   Resp 18   Ht 5' 3" (1 6 m)   Wt 100 kg (221 lb)   LMP 2021 (Exact Date)   SpO2 98%   Breastfeeding Yes   BMI 39 15 kg/m²       Intake/Output Summary (Last 24 hours) at 2022 0643  Last data filed at 2022 0419  Gross per 24 hour   Intake --   Output 7 ml   Net - ml       Lab Results   Component Value Date    WBC 11 65 (H) 2022    HGB 13 4 2022    HCT 39 9 2022    MCV 90 2022     2022       Physical Exam:     Gen: AAOx3, NAD  CV: RRR  Lungs: CTA b/l  Abd: Soft, non-tender, non-distended, no rebound or guarding  Uterine fundus firm and non-tender, 1 cm above the umbilicus     Ext: Non tender    Jamie Cm MD  2022  6:43 AM

## 2022-02-04 NOTE — DISCHARGE INSTRUCTIONS
Self Care After Delivery   AMBULATORY CARE:   The postpartum period is the period of time from delivery to about 6 weeks  During this time you may experience many physical and emotional changes  It is important to understand what is normal and when you need to call your healthcare provider  It is also important to know how to care for yourself during this time  Call your local emergency number (911 in the [address]) for any of the following:   · You see or hear things that are not there, or have thoughts of harming yourself or your baby  · You soak through 1 pad in 15 minutes, have blurry vision, clammy or pale skin, and feel faint  · You faint or lose consciousness  · You have trouble breathing  · You cough up blood  · Your  incision comes apart  Seek care immediately if:   · Your heart is beating faster than usual      · You have a bad headache or changes in your vision  · Your perineal tear, episiotomy site, or  incision is red, swollen, bleeding, or draining pus  · You have severe abdominal pain  Call your doctor or obstetrician if:   · Your leg is painful, red, and larger than usual      · You soak through 1 or more pads in an hour, or pass blood clots larger than a quarter from your vagina  · You have a fever  · You have new or worsening pain in your abdomen or vagina  · You continue to have depression 1 to 2 weeks after you deliver  · You have trouble sleeping  · You have foul-smelling discharge from your vagina  · You have pain or burning when you urinate  · You do not have a bowel movement for 3 days or more  · You have nausea or are vomiting  · You have hard lumps or red streaks over your breasts  · You have cracked nipples or bleed from your nipples  · You have questions or concerns about your condition or care  Physical changes:  The following are normal changes after you give birth:  · Pain in the area between your anus and vagina     · Breast pain     · Constipation or hemorrhoids     · Hot or cold flashes     · Vaginal bleeding or discharge     · Mild to moderate abdominal cramping     · Difficulty controlling bowel movements or urine     Emotional changes: A drop in hormone levels after you deliver may cause changes in your emotions  You may feel irritable, sad, or anxious  You may cry easily or for no reason  You may also feel depressed  Depression that continues can be a sign of postpartum depression, a condition that can be treated  Treatment may include talk therapy, medicines, or both  Healthcare providers will ask how you are feeling and if you have any depression  These talks can happen during appointments for your medical care and for your baby's care, such as well child visits  Providers can help you find ways to care for yourself and your baby  Talk to your providers about the following:  · When emotional changes or depression started, and if it is getting worse over time     · Problems you are having with daily activities, sleep, or caring for your baby     · If anything makes you feel worse, or makes you feel better     · Feeling that you are not bonding with your baby the way you want     · Any problems your baby has with sleeping or feeding     · Your baby is fussy or cries a lot     · Support you have from friends, family, or others     Breast care for breastfeeding mothers: You may have sore breasts for 3 to 6 days after you give birth  This happens as your milk begins to fill your breasts  You may also have sore breasts if you do not breastfeed frequently  Do the following to care for your breasts:  · Apply a moist, warm, compress to your breast as directed  This may help soothe your breasts  Make sure the washcloth is not too hot before you apply it to your breast      · Nurse your baby or pump your milk frequently  This may prevent clogged milk ducts   Ask your healthcare provider how often to nurse or pump  · Massage your breasts as directed  This may help increase your milk flow  Gently rub your breasts in a circular motion before you breastfeed  You may need to gently squeeze your breast or nipple to help release milk  You can also use a breast pump to help release milk from your breast      · Wash your breasts with warm water only  Do not put soap on your nipples  Soap may cause your nipples to become dry  · Apply lanolin cream to your nipples as directed  Lanolin cream may add moisture to your skin and prevent nipple dryness  Always wash off lanolin cream with warm water before you breastfeed  · Place pads in your bra  Your nipples may leak milk when you are not breastfeeding  You can place pads inside of your bra to help prevent leaking onto your clothing  Ask your healthcare provider where to purchase bra pads  · Get breastfeeding support if needed  Healthcare providers can answer questions about breastfeeding and provide you with support  Ask your healthcare provider who you can contact if you need breastfeeding support  Breast care for non-breastfeeding mothers: Milk will fill your breasts even if you bottle feed your baby  Do the following to help stop your milk from filling your breasts and causing pain:  · Wear a bra with support at all times  A sports bra or a tight-fitting bra will help stop your milk from coming in  · Apply ice on each breast for 15 to 20 minutes every hour or as directed  Use an ice pack, or put crushed ice in a plastic bag  Cover it with a towel before you apply it to your breast  Ice helps your milk ducts shrink  · Keep your breasts away from warm water  Warm water will make it easier for milk to fill your breasts  Stand with your breasts away from warm water in the shower  · Limit how much you touch your breasts  This will prevent them from filling with milk  Perineum care: Your perineum is the area between your rectum and vagina   It is normal to have swelling and pain in this area after you give birth  If you had an episiotomy, your healthcare provider may give you special instructions  · Clean your perineum after you use the bathroom  This may prevent infection and help with healing  Use a spray bottle with warm water to clean your perineum  You may also gently spray warm water against your perineum when you urinate  Always wipe front to back  · Take a sitz bath as directed  A sitz bath may help relieve swelling and pain  Fill your bath tub or bucket with water up to your hips and sit in the water  Use cold water for 2 days after you deliver  Then use warm water  Ask your healthcare provider for more information about a sitz bath  · Apply ice packs for the first 24 hours or as directed  Use a plastic glove filled with ice or buy an ice pack  Wrap the ice pack or plastic glove in a small towel or wash cloth  Place the ice pack on your perineum for 20 minutes at a time  · Sit on a donut-shaped pillow  This may relieve pressure on your perineum when you sit  · Use wipes that contain medicine or take pills as directed  Your healthcare provider may tell you to use witch hazel pads  You can place witch hazel pads in the refrigerator before you apply them to your perineum  Your provider may also tell you to take NSAIDs  Ask him or her how often to take pills or use the wipes  · Do not go swimming or take tub baths for 4 to 6 weeks or as directed  This will help prevent an infection in your vagina or uterus  Bowel and bladder care: It may take 3 to 5 days to have a bowel movement after you deliver your baby  You can do the following to prevent or manage constipation, and get control of your bowel or bladder:  · Take stool softeners as directed  A stool softener is medicine that will make your bowel movements softer  This may prevent or relieve constipation  A stool softener may also make bowel movements less painful       · Drink plenty of liquids  Ask how much liquid to drink each day and which liquids are best for you  Liquids may help prevent constipation  · Eat foods high in fiber  Examples include fruits, vegetables, grains, beans, and lentils  Ask your healthcare provider how much fiber you need each day  Fiber may prevent constipation  · Do Kegel exercises as directed  Kegel exercises will help strengthen the muscles that control bowel movements and urination  Ask your healthcare provider for more information on Kegel exercises  · Apply cold compresses or medicine to hemorrhoids as directed  This may relieve swelling and pain  Your healthcare provider may tell you to apply ice or wipes that contain medicine to your hemorrhoids  He or she may also tell you to use a sitz bath  Ask your provider for more information on how to manage hemorrhoids  Nutrition: Good nutrition is important in the postpartum period  It will help you return to a healthy weight, increase your energy levels, and prevent constipation  It will also help you get enough nutrients and calories if you are going to breastfeed your baby  · Eat a variety of healthy foods  Healthy foods include fruits, vegetables, whole-grain breads, low-fat dairy products, beans, lean meats, and fish  You may need 500 to 700 extra calories each day if you breastfeed your baby  You may also need extra protein  · Limit foods with added sugar and high amounts of fat  These foods are high in calories and low in healthy nutrients  Read food labels so you know how much sugar and fat is in the food you want to eat  · Drink 8 to 10 glasses of water per day  Water will help you make plenty of milk for your baby  It will also help prevent constipation  Drink a glass of water every time you breastfeed your baby  · Take vitamins as directed  Ask your healthcare provider what vitamins you need  · Limit caffeine and alcohol if you are breastfeeding   Caffeine and alcohol can get into your breast milk  Caffeine and alcohol can make your baby fussy  They can also interfere with your baby's sleep  Ask your healthcare provider if you can drink alcohol or caffeine  Rest and sleep: You may feel very tired in the postpartum period  Enough sleep will help you heal and give you energy to care for your baby  The following may help you get sleep and rest:  · Nap when your baby naps  Your baby may nap several times during the day  Get rest during this time  · Limit visitors  Many people may want to see you and your baby  Ask friends or family to visit on different days  This will give you time to rest      · Do not plan too much for one day  Put off household chores so that you have time to rest  Gradually do more each day  · Ask for help from family, friends, or neighbors  Ask them to help you with laundry, cleaning, or errands  Also ask someone to watch the baby while you take a nap or relax  Ask your partner to help with the care of your baby  Pump some of your breast milk so your partner can feed your baby during the night  Exercise after delivery: Wait until your healthcare provider says it is okay to exercise  Exercise can help you lose weight, increase your energy levels, and manage your mood  It can also prevent constipation and blood clots  Start with gentle exercises such as walking  Do more as you have more energy  You may need to avoid abdominal exercises for 1 to 2 weeks after you deliver  Talk to your healthcare provider about an exercise plan that is right for you  Sexual activity after delivery:   · Do not have sex until your healthcare provider says it is okay  You may need to wait 4 to 6 weeks before you have sex  This may prevent infection and allow time to heal      · Your menstrual cycle may begin as soon as 3 weeks after you deliver  Your period may be delayed if you breastfeed your baby   You can become pregnant before you get your first postpartum period  Talk to your healthcare provider about birth control that is right for you  Some types of birth control are not safe during breastfeeding  For support and more information: Join a support group for new mothers  Ask for help from family and friends with chores, errands, and care of your baby  · Office of Women's Health,  Department of Health and Human Services  [address], [address] [facility] [address]  [address], [address] [facility] [address]  Phone: 7- 038 - 691-7048  Web Address: www womenshealth gov  · [facility] Postpartum [address] [facility] , [address] [facility]  [address] [facility] , [address] [place] [address]  Web Address: [employer]) be  org/pregnancy/postpartum-care  aspx  Follow up with your doctor or obstetrician as directed: You will need to follow up within 2 to 6 weeks of delivery  Write down your questions so you remember to ask them at your visits  © Copyright [address] Information is for End User's use only and may not be sold, redistributed or otherwise used for commercial purposes  All illustrations and images included in CareNotes® are the copyrighted property of A D A M , Inc  or [facility] Kenji Ghotra   The above information is an  only  It is not intended as medical advice for individual conditions or treatments  Talk to your doctor, nurse or pharmacist before following any medical regimen to see if it is safe and effective for you

## 2022-02-05 NOTE — PLAN OF CARE
Problem: PAIN - ADULT  Goal: Verbalizes/displays adequate comfort level or baseline comfort level  Description: Interventions:  - Encourage patient to monitor pain and request assistance  - Assess pain using appropriate pain scale  - Administer analgesics based on type and severity of pain and evaluate response  - Implement non-pharmacological measures as appropriate and evaluate response  - Consider cultural and social influences on pain and pain management  - Notify physician/advanced practitioner if interventions unsuccessful or patient reports new pain  Outcome: Completed     Problem: INFECTION - ADULT  Goal: Absence or prevention of progression during hospitalization  Description: INTERVENTIONS:  - Assess and monitor for signs and symptoms of infection  - Monitor lab/diagnostic results  - Monitor all insertion sites, i e  indwelling lines, tubes, and drains  - Monitor endotracheal if appropriate and nasal secretions for changes in amount and color  - [place] appropriate cooling/warming therapies per order  - Administer medications as ordered  - Instruct and encourage patient and family to use good hand hygiene technique  - Identify and instruct in appropriate isolation precautions for identified infection/condition  Outcome: Completed  Goal: Absence of fever/infection during neutropenic period  Description: INTERVENTIONS:  - Monitor WBC    Outcome: Completed     Problem: SAFETY ADULT  Goal: Patient will remain free of falls  Description: INTERVENTIONS:  - Educate patient/family on patient safety including physical limitations  - Instruct patient to call for assistance with activity   - Consult OT/PT to assist with strengthening/mobility   - Keep Call bell within reach  - Keep bed low and locked with side rails adjusted as appropriate  - Keep care items and personal belongings within reach  - Initiate and maintain comfort rounds  - Apply yellow socks and bracelet for high fall risk patients  - Consider moving patient to room near nurses station  Outcome: Completed  Goal: Maintain or return to baseline ADL function  Description: INTERVENTIONS:  -  Assess patient's ability to carry out ADLs; assess patient's baseline for ADL function and identify physical deficits which impact ability to perform ADLs (bathing, care of mouth/teeth, toileting, grooming, dressing, etc )  - Assess/evaluate cause of self-care deficits   - Assess range of motion  - Assess patient's mobility; develop plan if impaired  - Assess patient's need for assistive devices and provide as appropriate  - Encourage maximum independence but intervene and supervise when necessary  - Involve family in performance of ADLs  - Assess for home care needs following discharge   - Consider OT consult to assist with ADL evaluation and planning for discharge  - Provide patient education as appropriate  Outcome: Completed  Goal: Maintains/Returns to pre admission functional level  Description: INTERVENTIONS:  - Perform BMAT or MOVE assessment daily    - Set and communicate daily mobility goal to care team and patient/family/caregiver  - Collaborate with rehabilitation services on mobility goals if consulted  - Out of bed for toileting  - Record patient progress and toleration of activity level   Outcome: Completed     Problem: Knowledge Deficit  Goal: Patient/family/caregiver demonstrates understanding of disease process, treatment plan, medications, and discharge instructions  Description: Complete learning assessment and assess knowledge base    Interventions:  - Provide teaching at level of understanding  - Provide teaching via preferred learning methods  Outcome: Completed     Problem: DISCHARGE PLANNING  Goal: Discharge to home or other facility with appropriate resources  Description: INTERVENTIONS:  - Identify barriers to discharge w/patient and caregiver  - Arrange for needed discharge resources and transportation as appropriate  - Identify discharge learning needs (meds, wound care, etc )  - Arrange for interpretive services to assist at discharge as needed  - Refer to Case Management Department for coordinating discharge planning if the patient needs post-hospital services based on physician/advanced practitioner order or complex needs related to functional status, cognitive ability, or social support system  Outcome: Completed     Problem: BIRTH - VAGINAL/ SECTION  Goal: Fetal and maternal status remain reassuring during the birth process  Description: INTERVENTIONS:  - Monitor vital signs  - Monitor fetal heart rate  - Monitor uterine activity  - Monitor labor progression (vaginal delivery)  - DVT prophylaxis  - Antibiotic prophylaxis  Outcome: Completed  Goal: Emotionally satisfying birthing experience for mother/fetus  Description: Interventions:  - Assess, plan, implement and evaluate the nursing care given to the patient in labor  - Advocate the philosophy that each childbirth experience is a unique experience and support the family's chosen level of involvement and control during the labor process   - Actively participate in both the patient's and family's teaching of the birth process  - Consider cultural, Hoahaoism and age-specific factors and plan care for the patient in labor  Outcome: Completed

## 2022-03-01 ENCOUNTER — POSTPARTUM VISIT (OUTPATIENT)
Dept: OBGYN CLINIC | Facility: CLINIC | Age: 27
End: 2022-03-01

## 2022-03-01 VITALS
BODY MASS INDEX: 35.26 KG/M2 | WEIGHT: 199 LBS | SYSTOLIC BLOOD PRESSURE: 126 MMHG | HEIGHT: 63 IN | DIASTOLIC BLOOD PRESSURE: 80 MMHG

## 2022-03-01 DIAGNOSIS — Z30.011 ENCOUNTER FOR INITIAL PRESCRIPTION OF CONTRACEPTIVE PILLS: ICD-10-CM

## 2022-03-01 PROCEDURE — PNV: Performed by: OBSTETRICS & GYNECOLOGY

## 2022-03-01 RX ORDER — SERTRALINE HYDROCHLORIDE 25 MG/1
25 TABLET, FILM COATED ORAL
Qty: 30 TABLET | Refills: 3 | Status: SHIPPED | OUTPATIENT
Start: 2022-03-01

## 2022-03-01 RX ORDER — NORETHINDRONE ACETATE AND ETHINYL ESTRADIOL 1MG-20(21)
1 KIT ORAL DAILY
Qty: 28 TABLET | Refills: 3 | Status: SHIPPED | OUTPATIENT
Start: 2022-03-01

## 2022-03-01 NOTE — PROGRESS NOTES
Subjective     Raisa Mc is a 32 y o  female who presents for a postpartum visit  She is 4 weeks postpartum following a spontaneous vaginal delivery  I have fully reviewed the prenatal and intrapartum course  The delivery was at 40   gestational weeks  Outcome: spontaneous vaginal delivery  Anesthesia: epidural  Postpartum course has been stable  Baby's course has been stable  Baby is feeding by bottle  Bleeding thin lochia  Bowel function is normal  Bladder function is normal  Patient is sexually active  Contraception method is OCP (estrogen/progesterone)  Postpartum depression screening:   Patient feeling mildly depressed sad denies any thoughts of hurting herself or hurting anyone able to sleep and to eat feeling very emotional desire medication to help has symptom as well as recommend psychotherapy patient aware if she has any thoughts of hurting herself or hurting anyone she need to call 911 immediately    The following portions of the patient's history were reviewed and updated as appropriate: allergies, current medications, past family history, past medical history, past social history, past surgical history and problem list     Review of Systems  Pertinent items are noted in HPI       Objective     /80 (BP Location: Left arm, Patient Position: Sitting, Cuff Size: Adult)   Ht 5' 3" (1 6 m)   Wt 90 3 kg (199 lb)   LMP 04/06/2021 (Exact Date)   BMI 35 25 kg/m²    General:  alert and oriented, in no acute distress    Breasts:  negative   Lungs:    Heart:     Abdomen: soft, non-tender; bowel sounds normal; no masses,  no organomegaly    Vulva:  normal   Vagina: normal vagina   Cervix:  no cervical motion tenderness and no lesions   Corpus: normal   Adnexa:  normal adnexa   Rectal Exam: Not performed       Assessment/Plan     80-year-old female   Postpartum vaginal delivery   Mild postpartum depression  Desire OCP contraception   Bottle feeding  Plan  Start Zoloft   Psychotherapy recommended Start OCP  Backup for the 1st 2 weeks   Return to office in 3 months for annual exam

## 2022-08-03 ENCOUNTER — OFFICE VISIT (OUTPATIENT)
Dept: OBGYN CLINIC | Facility: CLINIC | Age: 27
End: 2022-08-03
Payer: COMMERCIAL

## 2022-08-03 VITALS
BODY MASS INDEX: 35.97 KG/M2 | HEIGHT: 63 IN | DIASTOLIC BLOOD PRESSURE: 82 MMHG | SYSTOLIC BLOOD PRESSURE: 132 MMHG | WEIGHT: 203 LBS

## 2022-08-03 DIAGNOSIS — Z01.419 WOMEN'S ANNUAL ROUTINE GYNECOLOGICAL EXAMINATION: Primary | ICD-10-CM

## 2022-08-03 DIAGNOSIS — N92.6 IRREGULAR MENSES: ICD-10-CM

## 2022-08-03 DIAGNOSIS — Z12.4 SCREENING FOR MALIGNANT NEOPLASM OF THE CERVIX: ICD-10-CM

## 2022-08-03 LAB — SL AMB POCT URINE HCG: NEGATIVE

## 2022-08-03 PROCEDURE — 81025 URINE PREGNANCY TEST: CPT | Performed by: OBSTETRICS & GYNECOLOGY

## 2022-08-03 PROCEDURE — 0503F POSTPARTUM CARE VISIT: CPT | Performed by: OBSTETRICS & GYNECOLOGY

## 2022-08-03 PROCEDURE — 99395 PREV VISIT EST AGE 18-39: CPT | Performed by: OBSTETRICS & GYNECOLOGY

## 2022-08-03 PROCEDURE — G0145 SCR C/V CYTO,THINLAYER,RESCR: HCPCS | Performed by: OBSTETRICS & GYNECOLOGY

## 2022-08-03 NOTE — PROGRESS NOTES
Subjective      Winston Knutson is a 32 y o  female who presents for annual well woman exam  Periods are regular every 28-30 days, lasting 5 days  No intermenstrual bleeding, spotting, or discharge  Current contraception: none  History of abnormal Pap smear: yes -   Family history of uterine or ovarian cancer: no    Family history of breast cancer: no    Menstrual History:  OB History        2    Para   1    Term   1            AB   1    Living   1       SAB   0    IAB        Ectopic   0    Multiple   0    Live Births   1                  Patient's last menstrual period was 2022  Period Cycle (Days): 30  Period Duration (Days): 5  Period Pattern: Regular  Menstrual Flow: Moderate, Heavy  Dysmenorrhea: (!) Mild  Dysmenorrhea Symptoms: Cramping    The following portions of the patient's history were reviewed and updated as appropriate: allergies, current medications, past family history, past medical history, past social history, past surgical history and problem list     Review of Systems  Review of Systems   Constitutional: Negative for activity change, appetite change, chills, fatigue and fever  Respiratory: Negative for cough and shortness of breath  Cardiovascular: Negative for chest pain, palpitations and leg swelling  Gastrointestinal: Negative for abdominal pain, constipation, diarrhea, nausea and vomiting  Genitourinary: Negative for difficulty urinating, dysuria, flank pain, frequency, hematuria, urgency and vaginal discharge  Neurological: Negative for dizziness and headaches  Psychiatric/Behavioral: Negative for confusion            Objective      /82 (BP Location: Left arm, Patient Position: Sitting, Cuff Size: Adult)   Ht 5' 3" (1 6 m)   Wt 92 1 kg (203 lb)   LMP 2022   Breastfeeding No   BMI 35 96 kg/m²     Physical Exam  OBGyn Exam     General:   alert and oriented, in no acute distress, alert, appears stated age and cooperative   Heart: regular rate and rhythm, S1, S2 normal, no murmur, click, rub or gallop   Lungs: clear to auscultation bilaterally   Abdomen: soft, non-tender, without masses or organomegaly   Vulva: normal   Vagina: normal mucosa, normal discharge   Cervix: no cervical motion tenderness and no lesions   Uterus: normal size   Adnexa:  Breast Exam:  normal adnexa  breasts appear normal, no suspicious masses, no skin or nipple changes or axillary nodes  Assessment      @well woman@   33 yo female   Annual exam   Prior 1   Natural planning contraception   Pap ASCUS/HPV pos  NO COLPO SEC TO PREGNANCY   Plan    Pap/reflux   Diet/exercise   Prenatal vitamin daily  rto for annual exam    All questions answered  There are no Patient Instructions on file for this visit

## 2022-08-10 LAB
LAB AP GYN PRIMARY INTERPRETATION: NORMAL
Lab: NORMAL

## 2022-08-30 ENCOUNTER — OFFICE VISIT (OUTPATIENT)
Dept: URGENT CARE | Facility: CLINIC | Age: 27
End: 2022-08-30
Payer: COMMERCIAL

## 2022-08-30 VITALS
OXYGEN SATURATION: 99 % | RESPIRATION RATE: 20 BRPM | DIASTOLIC BLOOD PRESSURE: 81 MMHG | BODY MASS INDEX: 35.44 KG/M2 | SYSTOLIC BLOOD PRESSURE: 116 MMHG | HEIGHT: 63 IN | TEMPERATURE: 97.9 F | WEIGHT: 200 LBS | HEART RATE: 67 BPM

## 2022-08-30 DIAGNOSIS — R09.81 NASAL CONGESTION: Primary | ICD-10-CM

## 2022-08-30 PROCEDURE — 99213 OFFICE O/P EST LOW 20 MIN: CPT | Performed by: PHYSICIAN ASSISTANT

## 2022-08-30 PROCEDURE — 99203 OFFICE O/P NEW LOW 30 MIN: CPT | Performed by: PHYSICIAN ASSISTANT

## 2022-08-30 RX ORDER — METHYLPREDNISOLONE 4 MG/1
TABLET ORAL
Qty: 1 EACH | Refills: 0 | Status: SHIPPED | OUTPATIENT
Start: 2022-08-30

## 2022-08-30 RX ORDER — ALBUTEROL SULFATE 90 UG/1
2 AEROSOL, METERED RESPIRATORY (INHALATION) EVERY 6 HOURS PRN
Qty: 8.5 G | Refills: 0 | Status: SHIPPED | OUTPATIENT
Start: 2022-08-30

## 2022-08-30 NOTE — PATIENT INSTRUCTIONS
Patient was educated on cold symptoms  Patient was prescribed an inhaler and medrol dose pack  Patient was educated on side effects  Patient was told if symptoms get worst follow up with PCP or got to ED  Patient understood  Cold Symptoms   WHAT YOU NEED TO KNOW:   A cold is an infection caused by a virus  The infection causes your upper respiratory system to become inflamed  Common symptoms of a cold include sneezing, dry throat, a stuffy nose, headache, watery eyes, and a cough  Your cough may be dry, or you may cough up mucus  You may also have muscle aches, joint pain, and tiredness  Rarely, you may have a fever  Most colds go away without treatment  DISCHARGE INSTRUCTIONS:   Return to the emergency department if:   You have increased tiredness and weakness  You are unable to eat  Your heart is beating much faster than usual for you  You see white spots in the back of your throat and your neck is swollen and sore to the touch  You see pinpoint or larger reddish-purple dots on your skin  Contact your healthcare provider if:   You have a fever higher than 102°F (38 9°C)  You have new or worsening shortness of breath  You have thick nasal drainage for more than 2 days  Your symptoms do not improve or get worse within 5 days  You have questions or concerns about your condition or care  Medicines: The following medicines may be suggested by your healthcare provider to decrease your cold symptoms  These medicines are available without a doctor's order  Ask which medicines to take and when to take them  Follow directions  NSAIDs or acetaminophen  help to bring down a fever or decrease pain  Decongestants  help decrease nasal stuffiness  Antihistamines  help decrease sneezing and a runny nose  Cough suppressants  help decrease how much you cough  Expectorants  help loosen mucus so you can cough it up  Take your medicine as directed    Contact your healthcare provider if you think your medicine is not helping or if you have side effects  Tell him of her if you are allergic to any medicine  Keep a list of the medicines, vitamins, and herbs you take  Include the amounts, and when and why you take them  Bring the list or the pill bottles to follow-up visits  Carry your medicine list with you in case of an emergency  Symptom relief: The following may help relieve cold symptoms, such as a dry throat and congestion:  Gargle with mouthwash or warm salt water as directed  Suck on throat lozenges or hard candy  Use a cold or warm vaporizer or humidifier to ease your breathing  Rest for at least 2 days and then as needed to decrease tiredness and weakness  Use petroleum based jelly around your nostrils to decrease irritation from blowing your nose  Drink liquids:  Liquids will help thin and loosen thick mucus so you can cough it up  Liquids will also keep you hydrated  Ask your healthcare provider which liquids are best for you and how much to drink each day  Prevent the spread of germs: You can spread your cold germs to others for at least 3 days after your symptoms start  Wash your hands often  Do not share items, such as eating utensils  Cover your nose and mouth when you cough or sneeze using the crook of your elbow instead of your hands  Throw used tissues in the garbage  Do not smoke:  Smoking may worsen your symptoms and increase the length of time you feel sick  Talk with your healthcare provider if you need help to stop smoking  Follow up with your doctor as directed:  Write down your questions so you remember to ask them during your visits  © Copyright [employer] 2022 Information is for End User's use only and may not be sold, redistributed or otherwise used for commercial purposes   All illustrations and images included in CareNotes® are the copyrighted property of A D A M , Inc  or Lois Campos  The above information is an  only  It is not intended as medical advice for individual conditions or treatments  Talk to your doctor, nurse or pharmacist before following any medical regimen to see if it is safe and effective for you

## 2022-08-30 NOTE — PROGRESS NOTES
[facility]        NAME: Isaiah Bocanegra is a 32 y o  female  : 1995    MRN: [id number]  DATE: 2022  TIME: 11:54 AM    Assessment and Plan   Nasal congestion [R09 81]  1  Nasal congestion  albuterol (ProAir HFA) 90 mcg/act inhaler    methylPREDNISolone 4 MG tablet therapy pack     Declined COVID 19 testing  Patient Instructions     Patient was educated on cold symptoms  Patient was prescribed an inhaler and medrol dose pack  Patient was educated on side effects  Patient was told if symptoms get worst follow up with PCP or got to ED  Patient understood  Chief Complaint     Chief Complaint   Patient presents with    Nasal Congestion     Cold like symptoms x 3 days         History of Present Illness       Patient is here today complaining of congestion for 3 days  Patient reports she took her daughter to [facility] who was diagnosed with Bronchiolitis and she is worried she is developing the same thing  Patient admits history of Asthma and reports she currently does not have an inhaler  Patient reports she has tried OTC Dayquil with no relief  Denies and history of diabetes      Review of Systems   Review of Systems   HENT: Positive for congestion and rhinorrhea  Respiratory: Positive for shortness of breath  Cardiovascular: Negative  Neurological: Negative  Psychiatric/Behavioral: Negative            Current Medications       Current Outpatient Medications:     albuterol (ProAir HFA) 90 mcg/act inhaler, Inhale 2 puffs every 6 (six) hours as needed for wheezing, Disp: 8 5 g, Rfl: 0    methylPREDNISolone 4 MG tablet therapy pack, Use as directed on package, Disp: 1 each, Rfl: 0    acetaminophen (TYLENOL) 325 mg tablet, Take 650 mg by mouth every 6 (six) hours as needed for mild pain (Patient not taking: Reported on 2022), Disp: , Rfl:     benzocaine-menthol-lanolin-aloe (DERMOPLAST) 20-0 5 % topical spray, Apply 1 application topically every 6 (six) hours as needed for mild pain or irritation (Patient not taking: Reported on 8/30/2022), Disp: , Rfl: 0    ibuprofen (MOTRIN) 600 mg tablet, Take 1 tablet (600 mg total) by mouth every 6 (six) hours (Patient not taking: Reported on 8/30/2022), Disp: 30 tablet, Rfl: 0    norethindrone-ethinyl estradiol (JUNEL FE 1/20) 1-20 MG-MCG per tablet, Take 1 tablet by mouth daily (Patient not taking: Reported on 8/30/2022), Disp: 28 tablet, Rfl: 3    Prenatal Vit-Fe Fumarate-FA (PRENATAL 1+1 PO), Take by mouth (Patient not taking: Reported on 8/30/2022), Disp: , Rfl:     sertraline (Zoloft) 25 mg tablet, Take 1 tablet (25 mg total) by mouth daily at bedtime (Patient not taking: Reported on 8/30/2022), Disp: 30 tablet, Rfl: 3    simethicone (MYLICON) 80 mg chewable tablet, Chew 1 tablet (80 mg total) 4 (four) times a day as needed for flatulence (Patient not taking: Reported on 8/30/2022), Disp: 30 tablet, Rfl: 0    witch hazel-glycerin (TUCKS) topical pad, Apply 1 pad topically every 4 (four) hours as needed for irritation (Patient not taking: Reported on 8/30/2022), Disp: , Rfl: 0    Current Allergies     Allergies as of 08/30/2022 - Reviewed 08/30/2022   Allergen Reaction Noted    Shellfish-derived products - food allergy Hives 06/01/2021            The following portions of the patient's history were reviewed and updated as appropriate: allergies, current medications, past family history, past medical history, past social history, past surgical history and problem list      Past Medical History:   Diagnosis Date    Abnormal Pap smear of cervix     IGSIL    Asthma     has  inhaler    COVID-19     Before x-mas 2021    Gestational hypertension 2/3/2022    Migraine     Varicella     had shingels        Past Surgical History:   Procedure Laterality Date    WISDOM TOOTH EXTRACTION         Family History   Problem Relation Age of Onset    Mental illness Mother     Pancreatic cancer Mother     Bipolar disorder Mother    [facility] Alcohol abuse Mother     Thyroid disease Mother     No Known Problems Father     Lung cancer Maternal Grandmother     Diabetes Maternal Grandfather     Stomach cancer Paternal Grandmother     Multiple sclerosis Paternal Grandfather     No Known Problems Sister     No Known Problems Brother     Heart murmur Brother          Medications have been verified  Objective   /81   Pulse 67   Temp 97 9 °F (36 6 °C)   Resp 20   Ht 5' 3" (1 6 m)   Wt 90 7 kg (200 lb)   SpO2 99%   BMI 35 43 kg/m²   No LMP recorded  Physical Exam     Physical Exam  Vitals and nursing note reviewed  Constitutional:       Appearance: Normal appearance  HENT:      Head: Normocephalic  Comments: NO pain over frontal or maxillary sinus     Right Ear: Tympanic membrane, ear canal and external ear normal       Left Ear: Tympanic membrane, ear canal and external ear normal       Nose: Nose normal       Mouth/Throat:      Mouth: Mucous membranes are moist       Pharynx: No oropharyngeal exudate or posterior oropharyngeal erythema  Comments: PND  Eyes:      Extraocular Movements: Extraocular movements intact  Pupils: Pupils are equal, round, and reactive to light  Neck:      Comments: No palpable lymph nodes  Cardiovascular:      Rate and Rhythm: Normal rate and regular rhythm  Heart sounds: Normal heart sounds  Pulmonary:      Breath sounds: Normal breath sounds  No wheezing  Neurological:      Mental Status: She is alert

## 2023-03-28 LAB
EXTERNAL HIV SCREEN: NORMAL
HCV AB SER-ACNC: NEGATIVE

## 2023-04-27 ENCOUNTER — OFFICE VISIT (OUTPATIENT)
Dept: URGENT CARE | Facility: CLINIC | Age: 28
End: 2023-04-27

## 2023-04-27 VITALS
BODY MASS INDEX: 31.54 KG/M2 | HEART RATE: 81 BPM | WEIGHT: 178 LBS | RESPIRATION RATE: 18 BRPM | DIASTOLIC BLOOD PRESSURE: 62 MMHG | HEIGHT: 63 IN | OXYGEN SATURATION: 99 % | SYSTOLIC BLOOD PRESSURE: 118 MMHG | TEMPERATURE: 97.4 F

## 2023-04-27 DIAGNOSIS — M79.644 BILATERAL THUMB PAIN: Primary | ICD-10-CM

## 2023-04-27 DIAGNOSIS — M79.645 BILATERAL THUMB PAIN: Primary | ICD-10-CM

## 2023-04-27 RX ORDER — DEXTROAMPHETAMINE SACCHARATE, AMPHETAMINE ASPARTATE MONOHYDRATE, DEXTROAMPHETAMINE SULFATE AND AMPHETAMINE SULFATE 3.75; 3.75; 3.75; 3.75 MG/1; MG/1; MG/1; MG/1
15 CAPSULE, EXTENDED RELEASE ORAL DAILY
COMMUNITY
Start: 2023-01-29

## 2023-04-27 RX ORDER — ASPIRIN 81 MG/1
81 TABLET, CHEWABLE ORAL DAILY
COMMUNITY

## 2023-04-27 NOTE — PROGRESS NOTES
[facility]        NAME: Jonathan Tyler is a 32 y o  female  : 1995    MRN: [id number]  DATE: 2023  TIME: 12:47 PM    Assessment and Plan   Bilateral thumb pain [M79 644, M79 645]  1  Bilateral thumb pain              Patient Instructions   - Take Tylenol as needed  - Apply ice  - Rest  - Continue follow up with OBGYN and PCP  - Proceed to ER for any concerns or worsening of symptoms  Follow up with PCP in 3-5 days  Chief Complaint     Chief Complaint   Patient presents with   • Thumb Pain     PT presents with bilateral thumb pain and pressure x 1 day  Pt said she was recently seen at the ED x 3 days ago for viral infection and fluids  Pt is 12 weeks pregnant  History of Present Illness       31 y/o F whos 12 weeks pregnant presents for B/L thumb pain x 1 day  Pt was seen in the ED x 3 days ago for viral infection and fluids  Pt was writing a lot yesterday  Using Tylenol as needed  Denies numbness, tingeling, loss of sensation  Admits to swelling  Review of Systems   Review of Systems   Constitutional: Negative for chills and fever  Musculoskeletal: Positive for joint swelling  Swelling and pain   Skin: Negative for color change           Current Medications       Current Outpatient Medications:   •  acetaminophen (TYLENOL) 325 mg tablet, Take 650 mg by mouth every 6 (six) hours as needed for mild pain, Disp: , Rfl:   •  albuterol (ProAir HFA) 90 mcg/act inhaler, Inhale 2 puffs every 6 (six) hours as needed for wheezing, Disp: 8 5 g, Rfl: 0  •  aspirin 81 mg chewable tablet, Chew 81 mg daily, Disp: , Rfl:   •  Prenatal Vit-Fe Fumarate-FA (PRENATAL 1+1 PO), Take by mouth, Disp: , Rfl:   •  amphetamine-dextroamphetamine (ADDERALL XR) 15 MG 24 hr capsule, Take 15 mg by mouth daily, Disp: , Rfl:   •  benzocaine-menthol-lanolin-aloe (DERMOPLAST) 20-0 5 % topical spray, Apply 1 application topically every 6 (six) hours as needed for mild pain or irritation (Patient not taking: Reported on 8/30/2022), Disp: , Rfl: 0  •  ibuprofen (MOTRIN) 600 mg tablet, Take 1 tablet (600 mg total) by mouth every 6 (six) hours (Patient not taking: Reported on 8/30/2022), Disp: 30 tablet, Rfl: 0  •  methylPREDNISolone 4 MG tablet therapy pack, Use as directed on package (Patient not taking: Reported on 4/27/2023), Disp: 1 each, Rfl: 0  •  norethindrone-ethinyl estradiol (Jw Mort FE 1/20) 1-20 MG-MCG per tablet, Take 1 tablet by mouth daily (Patient not taking: Reported on 8/30/2022), Disp: 28 tablet, Rfl: 3  •  sertraline (Zoloft) 25 mg tablet, Take 1 tablet (25 mg total) by mouth daily at bedtime (Patient not taking: Reported on 8/30/2022), Disp: 30 tablet, Rfl: 3  •  simethicone (MYLICON) 80 mg chewable tablet, Chew 1 tablet (80 mg total) 4 (four) times a day as needed for flatulence (Patient not taking: Reported on 8/30/2022), Disp: 30 tablet, Rfl: 0  •  witch hazel-glycerin (TUCKS) topical pad, Apply 1 pad topically every 4 (four) hours as needed for irritation (Patient not taking: Reported on 8/30/2022), Disp: , Rfl: 0    Current Allergies     Allergies as of 04/27/2023 - Reviewed 04/27/2023   Allergen Reaction Noted   • Shellfish-derived products - food allergy Hives 06/01/2021            The following portions of the patient's history were reviewed and updated as appropriate: allergies, current medications, past family history, past medical history, past social history, past surgical history and problem list      Past Medical History:   Diagnosis Date   • Abnormal Pap smear of cervix     IGSIL   • Asthma     has  inhaler   • COVID-19     Before x-mas 2021   • Gestational hypertension 2/3/2022   • Migraine    • Varicella     had shingels        Past Surgical History:   Procedure Laterality Date   • WISDOM TOOTH EXTRACTION         Family History   Problem Relation Age of Onset   • Mental illness Mother    • Pancreatic cancer Mother    • Bipolar disorder Mother    • Alcohol abuse "Mother    • Thyroid disease Mother    • No Known Problems Father    • Lung cancer Maternal Grandmother    • Diabetes Maternal Grandfather    • Stomach cancer Paternal Grandmother    • Multiple sclerosis Paternal Grandfather    • No Known Problems Sister    • No Known Problems Brother    • Heart murmur Brother          Medications have been verified  Objective   /62   Pulse 81   Temp (!) 97 4 °F (36 3 °C)   Resp 18   Ht 5' 3\" (1 6 m)   Wt 80 7 kg (178 lb)   SpO2 99%   BMI 31 53 kg/m²   No LMP recorded  Physical Exam     Physical Exam  Vitals and nursing note reviewed  Constitutional:       General: She is not in acute distress  Appearance: She is not toxic-appearing  HENT:      Head: Normocephalic and atraumatic  Eyes:      Conjunctiva/sclera: Conjunctivae normal    Musculoskeletal:      Comments: Radial pulse intact  Tender to distal phalanx B/L  Tinel's and Finkelstein's negative B/L  ROM nl   Skin:     Capillary Refill: Capillary refill takes less than 2 seconds  Coloration: Skin is not pale  Findings: No bruising or erythema  Neurological:      Mental Status: She is alert     Psychiatric:         Mood and Affect: Mood normal          Behavior: Behavior normal                    "

## 2023-05-04 ENCOUNTER — TELEPHONE (OUTPATIENT)
Facility: HOSPITAL | Age: 28
End: 2023-05-04

## 2023-05-04 NOTE — TELEPHONE ENCOUNTER
Called patient to schedule MFM appointment, based on referral issued to Maternal Fetal Medicine by [facility] office  Left voicemail requesting patient to call back and schedule appointment, with office number for return call [phone]

## 2023-06-16 ENCOUNTER — ROUTINE PRENATAL (OUTPATIENT)
Facility: HOSPITAL | Age: 28
End: 2023-06-16
Payer: COMMERCIAL

## 2023-06-16 VITALS
WEIGHT: 187.83 LBS | DIASTOLIC BLOOD PRESSURE: 68 MMHG | HEIGHT: 63 IN | SYSTOLIC BLOOD PRESSURE: 124 MMHG | HEART RATE: 106 BPM | BODY MASS INDEX: 33.28 KG/M2

## 2023-06-16 DIAGNOSIS — Z36.86 ENCOUNTER FOR ANTENATAL SCREENING FOR CERVICAL LENGTH: ICD-10-CM

## 2023-06-16 DIAGNOSIS — Z36.89 ENCOUNTER FOR FETAL ANATOMIC SURVEY: ICD-10-CM

## 2023-06-16 DIAGNOSIS — Z3A.20 20 WEEKS GESTATION OF PREGNANCY: Primary | ICD-10-CM

## 2023-06-16 DIAGNOSIS — O09.899 SUPERVISION OF OTHER HIGH RISK PREGNANCIES, UNSPECIFIED TRIMESTER: ICD-10-CM

## 2023-06-16 PROBLEM — Z87.59 HISTORY OF GESTATIONAL HYPERTENSION: Status: ACTIVE | Noted: 2022-02-03

## 2023-06-16 PROBLEM — O99.519 ASTHMA DURING PREGNANCY: Status: ACTIVE | Noted: 2023-06-16

## 2023-06-16 PROBLEM — O09.891 SHORT INTERVAL BETWEEN PREGNANCIES AFFECTING PREGNANCY IN FIRST TRIMESTER, ANTEPARTUM: Status: ACTIVE | Noted: 2023-06-16

## 2023-06-16 PROBLEM — J45.909 ASTHMA DURING PREGNANCY: Status: ACTIVE | Noted: 2023-06-16

## 2023-06-16 PROCEDURE — 76817 TRANSVAGINAL US OBSTETRIC: CPT | Performed by: OBSTETRICS & GYNECOLOGY

## 2023-06-16 PROCEDURE — 76805 OB US >/= 14 WKS SNGL FETUS: CPT | Performed by: OBSTETRICS & GYNECOLOGY

## 2023-06-16 PROCEDURE — 99214 OFFICE O/P EST MOD 30 MIN: CPT | Performed by: OBSTETRICS & GYNECOLOGY

## 2023-06-16 NOTE — PROGRESS NOTES
Ultrasound Probe Disinfection    A transvaginal ultrasound was performed  Prior to use, disinfection was performed with High Level Disinfection Process (Trophon)  Probe serial number A2: [id number] was used        Maxwell Renee  06/16/23  1:38 PM

## 2023-06-16 NOTE — LETTER
Dear  Dr Rafa Weston was seen today for an ultrasound, which will be separately forwarded via Fax  Please contact our office if you do not receive the report, or with any questions/concerns      Sincerely,  Danay Will MD

## 2023-06-16 NOTE — PROGRESS NOTES
"[address]: Ms Oletta Boas was seen today at 20w0d for anatomic survey and cervical length screening ultrasound  See ultrasound report under \"OB Procedures\" tab  My recommendations are as follows:  1  Although encouraging, even a normal-appearing ultrasound cannot exclude all malformations, or the possibility of a genetic syndrome  We discussed the limitations of NIPS, as well as the option for definitive prenatal diagnosis using amniocentesis, which she declines  Follow-up evaluation of fetal growth and missed anatomy was advised at 30-32 weeks gestation  2  Short interval between pregnancies (<18 months between pregnancies) is associated with increased risk for  birth and low birthweight  A third trimester growth ultrasound is recommended  3  We discussed her history of Hand, Foot and Mouth disease during pregnancy, which is a Coxsackievirus not associated with known increased risk for fetal malformations or vertical transmission  There is a risk of viral aseptic meningitis as well as encephalitis associated with Coxsackievirus in pregnancy, and we reviewed signs/symptoms, which she denies at this time       Please don't hesitate to contact our office with any concerns or questions     -Danay Will MD  "

## 2023-06-16 NOTE — PROGRESS NOTES
Ultrasound Probe Disinfection    A transvaginal ultrasound was performed  Prior to use, disinfection was performed with High Level Disinfection Process (Trophon)  Probe serial number A4: [id number] was used        Josselin Dry  06/16/23  1:38 PM

## 2023-08-25 ENCOUNTER — ULTRASOUND (OUTPATIENT)
Facility: HOSPITAL | Age: 28
End: 2023-08-25
Payer: COMMERCIAL

## 2023-08-25 VITALS
SYSTOLIC BLOOD PRESSURE: 116 MMHG | HEART RATE: 111 BPM | HEIGHT: 63 IN | BODY MASS INDEX: 35.01 KG/M2 | DIASTOLIC BLOOD PRESSURE: 62 MMHG | WEIGHT: 197.6 LBS

## 2023-08-25 DIAGNOSIS — Z87.59 HISTORY OF GESTATIONAL HYPERTENSION: ICD-10-CM

## 2023-08-25 DIAGNOSIS — Z36.89 ENCOUNTER FOR ULTRASOUND TO CHECK FETAL GROWTH: ICD-10-CM

## 2023-08-25 DIAGNOSIS — Z3A.30 30 WEEKS GESTATION OF PREGNANCY: ICD-10-CM

## 2023-08-25 DIAGNOSIS — O09.891 SHORT INTERVAL BETWEEN PREGNANCIES AFFECTING PREGNANCY IN FIRST TRIMESTER, ANTEPARTUM: Primary | ICD-10-CM

## 2023-08-25 DIAGNOSIS — Z36.2 ENCOUNTER FOR FOLLOW-UP ULTRASOUND OF FETAL ANATOMY: ICD-10-CM

## 2023-08-25 DIAGNOSIS — O99.213 OBESITY AFFECTING PREGNANCY IN THIRD TRIMESTER: ICD-10-CM

## 2023-08-25 PROCEDURE — 76816 OB US FOLLOW-UP PER FETUS: CPT | Performed by: STUDENT IN AN ORGANIZED HEALTH CARE EDUCATION/TRAINING PROGRAM

## 2023-08-25 NOTE — PROGRESS NOTES
[address]: Ms. Rob Landa was seen today for fetal growth and followup missed anatomy ultrasound. See ultrasound report under "OB Procedures" tab. The time spent on this established patient on the encounter date included 5 minutes previsit service time reviewing records and precharting, 6 minutes face-to-face service time counseling regarding results and coordinating care, and  5 minutes charting, totalling 16 minutes. Please don't hesitate to contact our office with any concerns or questions.   -Taylor Ricketts MD

## 2023-08-25 NOTE — LETTER
August 25, 2023     Elaina Lawrence MD  [address] [facility] [address]    Patient: Va Dietrich   YOB: 1995   Date of Visit: 8/25/2023       Dear Dr. Ranjana Thurman: Thank you for referring Va Dietrich to me for evaluation. Below are my notes for this consultation. If you have questions, please do not hesitate to call me. I look forward to following your patient along with you. Sincerely,        Mariana Blanton MD        CC: No Recipients    Mariana Blanton MD  8/25/2023  1:51 PM  Sign when Signing Visit  [address] [facility] [address]: Ms. Nick Camarena was seen today for fetal growth and followup missed anatomy ultrasound. See ultrasound report under "OB Procedures" tab. The time spent on this established patient on the encounter date included 5 minutes previsit service time reviewing records and precharting, 6 minutes face-to-face service time counseling regarding results and coordinating care, and  5 minutes charting, totalling 16 minutes. Please don't hesitate to contact our office with any concerns or questions.   -Mariana Blanton MD

## 2023-10-06 ENCOUNTER — ULTRASOUND (OUTPATIENT)
Facility: HOSPITAL | Age: 28
End: 2023-10-06
Payer: COMMERCIAL

## 2023-10-06 VITALS
HEIGHT: 63 IN | WEIGHT: 203.8 LBS | BODY MASS INDEX: 36.11 KG/M2 | DIASTOLIC BLOOD PRESSURE: 78 MMHG | HEART RATE: 99 BPM | SYSTOLIC BLOOD PRESSURE: 118 MMHG

## 2023-10-06 DIAGNOSIS — O09.891 SHORT INTERVAL BETWEEN PREGNANCIES AFFECTING PREGNANCY IN FIRST TRIMESTER, ANTEPARTUM: ICD-10-CM

## 2023-10-06 DIAGNOSIS — Z36.89 ENCOUNTER FOR ULTRASOUND TO CHECK FETAL GROWTH: ICD-10-CM

## 2023-10-06 DIAGNOSIS — Z3A.36 36 WEEKS GESTATION OF PREGNANCY: ICD-10-CM

## 2023-10-06 DIAGNOSIS — Z87.59 HISTORY OF GESTATIONAL HYPERTENSION: Primary | ICD-10-CM

## 2023-10-06 DIAGNOSIS — O40.3XX0 POLYHYDRAMNIOS IN THIRD TRIMESTER COMPLICATION, SINGLE OR UNSPECIFIED FETUS: ICD-10-CM

## 2023-10-06 DIAGNOSIS — O99.210 OBESITY AFFECTING PREGNANCY, ANTEPARTUM, UNSPECIFIED OBESITY TYPE: ICD-10-CM

## 2023-10-06 DIAGNOSIS — Z36.2 ENCOUNTER FOR FOLLOW-UP ULTRASOUND OF FETAL ANATOMY: ICD-10-CM

## 2023-10-06 PROCEDURE — 76816 OB US FOLLOW-UP PER FETUS: CPT | Performed by: STUDENT IN AN ORGANIZED HEALTH CARE EDUCATION/TRAINING PROGRAM

## 2023-10-06 PROCEDURE — 99213 OFFICE O/P EST LOW 20 MIN: CPT | Performed by: STUDENT IN AN ORGANIZED HEALTH CARE EDUCATION/TRAINING PROGRAM

## 2023-10-06 NOTE — LETTER
October 6, 2023     Buffy Wilcox MD  [address] [facility] [address]    Patient: John Jin   YOB: 1995   Date of Visit: 10/6/2023       Dear Dr. Milagro Malone: Thank you for referring John Jin to me for evaluation. Below are my notes for this consultation. If you have questions, please do not hesitate to call me. I look forward to following your patient along with you. Sincerely,        Nabeel Calderon MD        CC: No Recipients    Nabeel Calderon MD  10/6/2023  1:32 PM  Sign when Signing Visit  [address] [facility] [address]: Ms. Patrick Castaneda was seen today for fetal growth and followup missed anatomy ultrasound. See ultrasound report under "OB Procedures" tab. The time spent on this established patient on the encounter date included 5 minutes previsit service time reviewing records and precharting, 5 minutes face-to-face service time counseling regarding results and coordinating care, and  5 minutes charting, totalling 15 minutes. Please don't hesitate to contact our office with any concerns or questions.   -Nabeel Calderon MD

## 2023-10-06 NOTE — PROGRESS NOTES
[address] [facility] [address]: Ms. Fausto Carlin was seen today for fetal growth and followup missed anatomy ultrasound. See ultrasound report under "OB Procedures" tab. The time spent on this established patient on the encounter date included 5 minutes previsit service time reviewing records and precharting, 5 minutes face-to-face service time counseling regarding results and coordinating care, and  5 minutes charting, totalling 15 minutes. Please don't hesitate to contact our office with any concerns or questions.   -Corky Sicard, MD

## 2025-07-21 ENCOUNTER — OFFICE VISIT (OUTPATIENT)
Dept: OBGYN CLINIC | Facility: CLINIC | Age: 30
End: 2025-07-21
Payer: COMMERCIAL

## 2025-07-21 VITALS
BODY MASS INDEX: 29.23 KG/M2 | SYSTOLIC BLOOD PRESSURE: 140 MMHG | WEIGHT: 165 LBS | HEIGHT: 63 IN | DIASTOLIC BLOOD PRESSURE: 86 MMHG

## 2025-07-21 DIAGNOSIS — Z30.011 ENCOUNTER FOR INITIAL PRESCRIPTION OF CONTRACEPTIVE PILLS: Primary | ICD-10-CM

## 2025-07-21 DIAGNOSIS — N93.9 ABNORMAL UTERINE BLEEDING: ICD-10-CM

## 2025-07-21 PROCEDURE — 99213 OFFICE O/P EST LOW 20 MIN: CPT | Performed by: OBSTETRICS & GYNECOLOGY

## 2025-07-21 RX ORDER — ACETAMINOPHEN AND CODEINE PHOSPHATE 120; 12 MG/5ML; MG/5ML
1 SOLUTION ORAL DAILY
Qty: 28 TABLET | Refills: 3 | Status: CANCELLED | OUTPATIENT
Start: 2025-07-21

## 2025-07-21 NOTE — PROGRESS NOTES
Pre-charting for Appointment      Reason for being seen today: vaginal bleeding    Any current testing/imaging done prior to exam today:

## 2025-07-22 NOTE — PROGRESS NOTES
"Assessment/Plan:     Diagnoses and all orders for this visit:    Encounter for initial prescription of contraceptive pills    Amenorrhea        29-year-old female  Abnormal uterine bleeding/episodes of menorrhagia  Prior vaginal delivery and prior   Currently not on contraception  Pregnancy-induced hypertension/borderline hypertension  Plan  Urine pregnancy test came back negative in the office today  Desires progesterone only pills for contraception  Return to office in 3 months follow-up and annual exam  Subjective:      Patient ID: Felecia Snider is a 29 y.o. female.    HPI  Patient seen evaluated presented office today complaining of episode of heavy vaginal bleeding  Patient did pregnancy test at home and came back positive  Patient had prior vaginal delivery and 1  done last year  Patient last menstrual period was Windy 15 and has episodes of heavy bleeding on  passing clots patient did pregnancy test at home came back positive but today urine pregnancy test came back negative  Possibility of false positive\" negative result reviewed discussed with patient  Possibility of miscarriage still reviewed reviewed and discussed with patient  Patient is not planning on conceiving and desires contraception  Different contraception option Nexplanon discussed with patient in details with risk-benefit side effect patient is not candidate for estrogen-containing OCP secondary to elevated blood pressure and pregnancy-induced hypertension recommend progesterone only method patient desires to stop progesterone pills risk-benefit side effect reviewed discussed with patient all patient questions answered and patient was satisfied  The following portions of the patient's history were reviewed and updated as appropriate: allergies, current medications, past family history, past medical history, past social history, past surgical history and problem list.    Review of Systems      Objective:      BP " "140/86 (BP Location: Left arm, Patient Position: Sitting, Cuff Size: Adult)   Ht 5' 3\" (1.6 m)   Wt 74.8 kg (165 lb)   BMI 29.23 kg/m²          Physical Exam  Constitutional:       Appearance: She is well-developed.   Abdominal:      General: There is no distension.      Palpations: Abdomen is soft.      Tenderness: There is no abdominal tenderness.   Genitourinary:     Labia:         Right: No rash, tenderness or lesion.         Left: No rash, tenderness or lesion.       Vagina: No signs of injury. Bleeding present. No vaginal discharge, erythema or tenderness.      Cervix: Cervical bleeding present. No cervical motion tenderness, discharge or friability.      Adnexa:         Right: No mass, tenderness or fullness.          Left: No mass, tenderness or fullness.       Neurological:      Mental Status: She is alert and oriented to person, place, and time.     Psychiatric:         Behavior: Behavior normal.           "

## 2025-07-23 ENCOUNTER — TELEPHONE (OUTPATIENT)
Age: 30
End: 2025-07-23

## 2025-07-23 DIAGNOSIS — Z30.011 ENCOUNTER FOR INITIAL PRESCRIPTION OF CONTRACEPTIVE PILLS: Primary | ICD-10-CM

## 2025-07-23 RX ORDER — ACETAMINOPHEN AND CODEINE PHOSPHATE 120; 12 MG/5ML; MG/5ML
1 SOLUTION ORAL DAILY
Qty: 28 TABLET | Refills: 3 | Status: SHIPPED | OUTPATIENT
Start: 2025-07-23

## 2025-08-15 DIAGNOSIS — F90.8 OTHER SPECIFIED ATTENTION DEFICIT HYPERACTIVITY DISORDER (ADHD): Primary | ICD-10-CM

## 2025-08-15 PROBLEM — R73.09 ABNORMAL GLUCOSE TOLERANCE TEST: Status: ACTIVE | Noted: 2025-08-15

## 2025-08-15 PROBLEM — D64.9 ANEMIA, UNSPECIFIED: Status: ACTIVE | Noted: 2025-08-15

## 2025-08-18 RX ORDER — DEXTROAMPHETAMINE SACCHARATE, AMPHETAMINE ASPARTATE, DEXTROAMPHETAMINE SULFATE AND AMPHETAMINE SULFATE 5; 5; 5; 5 MG/1; MG/1; MG/1; MG/1
20 TABLET ORAL 2 TIMES DAILY
Qty: 60 TABLET | Refills: 0 | Status: SHIPPED | OUTPATIENT
Start: 2025-08-18

## 2025-08-22 PROBLEM — Z80.0 FAMILY HISTORY OF MALIGNANT NEOPLASM OF GASTROINTESTINAL TRACT: Status: ACTIVE | Noted: 2025-08-22
